# Patient Record
Sex: FEMALE | Race: WHITE | NOT HISPANIC OR LATINO
[De-identification: names, ages, dates, MRNs, and addresses within clinical notes are randomized per-mention and may not be internally consistent; named-entity substitution may affect disease eponyms.]

---

## 2017-07-29 ENCOUNTER — HEALTH MAINTENANCE LETTER (OUTPATIENT)
Age: 27
End: 2017-07-29

## 2017-09-19 ENCOUNTER — OFFICE VISIT (OUTPATIENT)
Dept: PLASTIC SURGERY | Facility: CLINIC | Age: 27
End: 2017-09-19

## 2017-09-19 ENCOUNTER — MEDICAL CORRESPONDENCE (OUTPATIENT)
Dept: HEALTH INFORMATION MANAGEMENT | Facility: CLINIC | Age: 27
End: 2017-09-19

## 2017-09-19 VITALS
HEART RATE: 74 BPM | SYSTOLIC BLOOD PRESSURE: 122 MMHG | DIASTOLIC BLOOD PRESSURE: 82 MMHG | HEIGHT: 66 IN | OXYGEN SATURATION: 97 % | TEMPERATURE: 98.5 F | BODY MASS INDEX: 37.37 KG/M2 | WEIGHT: 232.5 LBS

## 2017-09-19 DIAGNOSIS — F64.0 GENDER DYSPHORIA IN ADOLESCENT AND ADULT: Primary | ICD-10-CM

## 2017-09-19 RX ORDER — TESTOSTERONE ENANTHATE 200 MG/ML
100 INJECTION, SOLUTION INTRAMUSCULAR
COMMUNITY
End: 2018-03-06 | Stop reason: DRUGHIGH

## 2017-09-19 ASSESSMENT — PAIN SCALES - GENERAL: PAINLEVEL: NO PAIN (0)

## 2017-09-19 NOTE — MR AVS SNAPSHOT
"              After Visit Summary   2017    Luisana Quinonez    MRN: [id number]           Patient Information     Date Of Birth          1990        Visit Information        Provider Department      2017 9:00 AM Ely Ackerman MD [facility] Plastic and Reconstructive Surgery        Today's Diagnoses     Gender dysphoria in adolescent and adult    -  1       Follow-ups after your visit        Who to contact     Please call your clinic at [phone] to:    Ask questions about your health    Make or cancel appointments    Discuss your medicines    Learn about your test results    Speak to your doctor   If you have compliments or concerns about an experience at your clinic, or if you wish to file a complaint, please contact [facility] Patient Relations at [phone] or email us at [email or web address]         Additional Information About Your Visit        MyChart Information     [employer] is an electronic gateway that provides easy, online access to your medical records. With [employer], you can request a clinic appointment, read your test results, renew a prescription or communicate with your care team.     To sign up for [employer] visit the website at [email or web address]   You will be asked to enter the access code listed below, as well as some personal information. Please follow the directions to create your username and password.     Your access code is: [email or web address]  Expires: 2017  5:30 AM     Your access code will  in 90 days. If you need help or a new code, please contact your [facility] or call [phone] for assistance.        Care EveryWhere ID     This is your Care EveryWhere ID. This could be used by other organizations to access your [place] medical records  [id number]        Your Vitals Were     Pulse Temperature Height Pulse Oximetry BMI (Body Mass Index)       74 98.5  F (36.9  C) (Oral) 5' 6\" " 97% 37.53 kg/m2        Blood Pressure from Last 3 Encounters:   09/19/17 122/82   12/08/11 121/70   05/16/11 110/64    Weight from Last 3 Encounters:   09/19/17 232 lb 8 oz   05/16/11 176 lb (94 %)*   03/13/09 151 lb (88 %)*     * Growth percentiles are based on [facility] 2-20 Years data.              Today, you had the following     No orders found for display       Primary Care Provider Office Phone # Fax #    David Hernandez [phone]       [facility] [address]  [facility] [address]        Equal Access to Services     [facility]: Hadii mode vaughno Sohumberto, waaxda luqadaha, qaybta kaalmada adejigneshda, dolores tinoco . So [facility] [phone].    ATENCIÓN: Si habla español, tiene a galindo disposición servicios gratuitos de asistencia lingüística. Llame al [phone].    We comply with applicable federal civil rights laws and [place] laws. We do not discriminate on the basis of race, color, national origin, age, disability, sex, sexual orientation, or gender identity.            Thank you!     Thank you for choosing [facility] PLASTIC AND RECONSTRUCTIVE SURGERY  for your care. Our goal is always to provide you with excellent care. Hearing back from our patients is one way we can continue to improve our services. Please take a few minutes to complete the written survey that you may receive in the mail after your visit with us. Thank you!             Your Updated Medication List - Protect others around you: Learn how to safely use, store and throw away your medicines at www.disposemymeds.org.          This list is accurate as of: 9/19/17 11:59 PM.  Always use your most recent med list.                   Brand Name Dispense Instructions for use Diagnosis    cetirizine 10 MG tablet    zyrTEC    30 tablet    Take 1 tablet by mouth daily.    Allergic rhinitis, Conjunctivitis, allergic       * DEPAKOTE PO      Take 125 mg by mouth 2 times daily        * divalproex 250  MG EC tablet    DEPAKOTE    270 tablet    Take 3 tablets by mouth 2 times daily.    Intractable absence seizures (H)       fluticasone 50 MCG/ACT spray    FLONASE    1 Package    1 spray by Both Nostrils route 2 times daily. 1 spray each nostril AM & PM    Allergic rhinitis       ketorolac 0.5 % ophthalmic solution    ACULAR    5 mL    Place 1 drop into both eyes 4 times daily.    Conjunctivitis, allergic, Allergic rhinitis       ondansetron 2 MG/ML Soln    ZOFRAN     Inject 0.1 mg/kg into the vein every 6 hours as needed for nausea or vomiting        predniSONE 20 MG tablet    DELTASONE    10 tablet    Take 1 tablet by mouth 2 times daily. For asthma attack    Intermittent asthma       PROAIR  (90 BASE) MCG/ACT Inhaler   Generic drug:  albuterol     3 Inhaler    Inhale 2 puffs into the lungs every 2 hours as needed.    Cough       testosterone enanthate 200 MG/ML injection    DELATESTRYL     Inject 100 mg into the muscle once as needed (WEEK)        TYLENOL PO      Take 160 mg by mouth every 4 hours as needed for mild pain or fever        * Notice:  This list has 2 medication(s) that are the same as other medications prescribed for you. Read the directions carefully, and ask your doctor or other care provider to review them with you.

## 2017-09-19 NOTE — NURSING NOTE
"Chief Complaint   Patient presents with     Consult     consult for top surgery        Vitals:    09/19/17 0946   BP: 122/82   Pulse: 74   Temp: 98.5  F (36.9  C)   TempSrc: Oral   SpO2: 97%   Weight: 232 lb 8 oz   Height: 5' 6\"       Body mass index is 37.53 kg/(m^2).      Shayan HARVEY                  "

## 2017-09-19 NOTE — LETTER
"9/19/2017       RE: Luisana Quinonez  [address] [place]  [facility] [address]     Dear Colleague,    Thank you for referring your patient, Luisana Quinonez, to the [facility] PLASTIC AND RECONSTRUCTIVE SURGERY at [facility]. Please see a copy of my visit note below.    PLASTICS NEW    This is a 26 year old biological female who goes by the name \"Jen\" and uses male pronouns. He said the checkin process was \"weird\" with respect to gender issues. He is here to discuss possible top surgery.  He found us online.   He has been on testosterone topical gel since December, then switched to injections in July and is still adjusting his dose/levels with Dr Riccardo Torrez at the [facility].  He has also been seeing his therapist Nba Castro at [facility] for the past 6 months and has an old letter of support that will need to be updated.     He has been living his chosen gender role for the past 4 years, but had a sense of his gender identity as far back as age 5. He does not bind his breasts and denies any history of personal breast problems.    PMH: gender dysphoria. Obesity. Acne. Asthma- inhaler PRN, activated by exercise/animal dander/seasonal allergies/cold. History of absence epileptic seizures age 5-4 years, on meds. Achilles tendinitis, ? Plantar fasciitis. Denies diabetes, GERD, mental health diagnoses, bleeding/clotting or healing/scarring issues.     PSH: 3rd molar extractions. No problem with anesthesia.     FHX: breast cancer - MGM, no ovarian cancer. MGM-DVTs. Dad - MIx3.     SHX: out of work as a  at the [employer]. 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 Family is supportive of his transition, father still processing it. Living with roommate. Non-smoker, social ETOH. Diet- omnivore, eats fast/processed foods at work. Likes breads, not sweets. Pop-1 or 2 cans per day. Coffee 1-2 cups per week. Exercise- active at work, will " "start at gym. Sleep- slight delay, but usually averages 7-9 hours.     PE: 5'5.5\", 232 lbs.  Obese. Large breasts, grade 3 ptosis. Low IMFs at least 3-4 cms. Fibrofatty on palpation, no masses. No adenopathy. Moderate lateral thoracic rolls. Decent anterior chest contour. Scattered acne. Photos taken with written consent.    A/P: gender dysphoria. Good candidate for bilateral SQ mastectomies with nipple grafts. Will need mammogram. Has [employer] insurance so will need an updated letter of support from his therapist.     Total time = 45 minutes, greater than half spent on discussing surgical options and insurance issues.        Again, thank you for allowing me to participate in the care of your patient.      Sincerely,    Ely Ackerman MD      "

## 2017-09-22 ENCOUNTER — TELEPHONE (OUTPATIENT)
Dept: SURGERY | Facility: CLINIC | Age: 27
End: 2017-09-22

## 2017-09-22 NOTE — TELEPHONE ENCOUNTER
Received therapist letter from Dr Wang from [facility].  Left voice message with patient to call me back

## 2017-09-22 NOTE — TELEPHONE ENCOUNTER
Discovered that patient is listed under MRN#[id number] as a male -Jen-has correct address information and insurance information.  MRN-patient is listed as a female-nothing correct on this account.  I am asking for help on how to fix

## 2017-10-16 ENCOUNTER — TELEPHONE (OUTPATIENT)
Dept: SURGERY | Facility: CLINIC | Age: 27
End: 2017-10-16

## 2017-10-16 NOTE — TELEPHONE ENCOUNTER
Received a voice message from patient.  Left a voice message, stating his insurance is on a calendar year, so will need to re-enroll in January.  Once that is done, please call me back with insurance information

## 2017-10-20 ENCOUNTER — TELEPHONE (OUTPATIENT)
Dept: SURGERY | Facility: CLINIC | Age: 27
End: 2017-10-20

## 2017-10-20 NOTE — TELEPHONE ENCOUNTER
Received phone call from patient, states he called insurance, they will not do an extension on a prior authorization request.  Asked him then if his insurance needs renewing at end of year, he states no, told I would begin work on the prior authorization request this coming monday

## 2017-10-24 ENCOUNTER — TELEPHONE (OUTPATIENT)
Dept: SURGERY | Facility: CLINIC | Age: 27
End: 2017-10-24

## 2017-10-24 NOTE — TELEPHONE ENCOUNTER
Received phone call from patient, wondering if I had submitted the prior authorization request yet.  No I have not.  I talked to dr Ackerman's nurse-Eloisa, she will pass message onto dr Ackerman that I need the documentation

## 2017-10-31 ENCOUNTER — TELEPHONE (OUTPATIENT)
Dept: SURGERY | Facility: CLINIC | Age: 27
End: 2017-10-31

## 2017-10-31 NOTE — TELEPHONE ENCOUNTER
Received phone call from patient, wanting to know status of account.  I called and talked to Eloisa-Dr Ackerman's nurse.  Dr Ackerman has been out of town recently, back and forth for conferences.  Left voice message with patient to call me back, getting documentation is top priority, give us more time

## 2017-11-09 ENCOUNTER — TELEPHONE (OUTPATIENT)
Dept: PLASTIC SURGERY | Facility: CLINIC | Age: 27
End: 2017-11-09

## 2017-11-10 NOTE — TELEPHONE ENCOUNTER
Nurse returned patients voicemail about needing physicians notes entered so that the PA could be done so surgery could be scheduled.  Spoke with patient regarding concerns that surgery would not be able to be scheduled by March when they have it all planned for or that the surgery and authorization would not be done in time before the insurance expires next December 2018.  Nurse let the patient know that Dr. Ackerman is working on entering the notes and that the nurse would check with Dr. Ackerman again on Tuesday 11/14/2017.  The patient was extremely upset about the dual patient records and the time taken to combine them along with the delay in the prior authorization.  The Nurse will call the patient back by Monday 11/20 to let them know where everything is.  The patient verbalized that, that was acceptable and would wait until then.

## 2017-11-13 NOTE — PROGRESS NOTES
"PLASTICS NEW    This is a 26 year old biological female who goes by the name \"Jen\" and uses male pronouns. He said the checkin process was \"weird\" with respect to gender issues. He is here to discuss possible top surgery.  He found us online.   He has been on testosterone topical gel since December, then switched to injections in July and is still adjusting his dose/levels with Dr Riccardo Torrez at the [facility].  He has also been seeing his therapist Nba Castro at [facility] for the past 6 months and has an old letter of support that will need to be updated.     He has been living his chosen gender role for the past 4 years, but had a sense of his gender identity as far back as age 5. He does not bind his breasts and denies any history of personal breast problems.    PMH: gender dysphoria. Obesity. Acne. Asthma- inhaler PRN, activated by exercise/animal dander/seasonal allergies/cold. History of absence epileptic seizures age 3-4 years, on meds. Achilles tendinitis, ? Plantar fasciitis. Denies diabetes, GERD, mental health diagnoses, bleeding/clotting or healing/scarring issues.     PSH: 3rd molar extractions. No problem with anesthesia.     FHX: breast cancer - MGM, no ovarian cancer. MGM-DVTs. Dad - MIx3.     SHX: out of work as a  at the [employer]. 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 Family is supportive of his transition, father still processing it. Living with roommate. Non-smoker, social ETOH. Diet- omnivore, eats fast/processed foods at work. Likes breads, not sweets. Pop-1 or 2 cans per day. Coffee 1-2 cups per week. Exercise- active at work, will start at gym. Sleep- slight delay, but usually averages 7-9 hours.     PE: 5'5.5\", 232 lbs.  Obese. Large breasts, grade 3 ptosis. Low IMFs at least 3-4 cms. Fibrofatty on palpation, no masses. No adenopathy. Moderate lateral thoracic rolls. Decent anterior chest contour. Scattered acne. Photos taken with " written consent.    A/P: gender dysphoria. Good candidate for bilateral SQ mastectomies with nipple grafts. Will need mammogram. Has [employer] insurance so will need an updated letter of support from his therapist.     Total time = 45 minutes, greater than half spent on discussing surgical options and insurance issues.

## 2017-11-14 ENCOUNTER — TELEPHONE (OUTPATIENT)
Dept: SURGERY | Facility: CLINIC | Age: 27
End: 2017-11-14

## 2017-11-14 NOTE — TELEPHONE ENCOUNTER
Talked to Quin RANDHAWAWISETIVI, told her I would be faxing in request for prior authorization soon, I can fax to her attention@[phone] and galdino quan.  Also left a voice message with patient to let him know I would be doing this.  At this time I am waiting for codes from  to use.

## 2017-11-15 ENCOUNTER — TELEPHONE (OUTPATIENT)
Dept: SURGERY | Facility: CLINIC | Age: 27
End: 2017-11-15

## 2017-11-15 NOTE — TELEPHONE ENCOUNTER
[facility]she will give to correct department for patient.  Marked as urgent

## 2017-11-16 ENCOUNTER — TELEPHONE (OUTPATIENT)
Dept: SURGERY | Facility: CLINIC | Age: 27
End: 2017-11-16

## 2017-11-16 NOTE — TELEPHONE ENCOUNTER
Received [employer] prior authorization approval #[id number], with date span 11/15/17-11/14/18.  Also received phone call from [employer], she called and talked to mother, told her this was approved.  I will send message to care team to let them know he is ready to be scheduled.

## 2017-11-21 ENCOUNTER — TELEPHONE (OUTPATIENT)
Dept: SURGERY | Facility: CLINIC | Age: 27
End: 2017-11-21

## 2017-11-21 NOTE — TELEPHONE ENCOUNTER
Patient calling in, has been waiting for a phone call for scheduling surgery with dr Ackermna, gave him phone # to call and ask to schedule

## 2018-03-06 ENCOUNTER — OFFICE VISIT (OUTPATIENT)
Dept: PLASTIC SURGERY | Facility: CLINIC | Age: 28
End: 2018-03-06
Payer: COMMERCIAL

## 2018-03-06 VITALS
DIASTOLIC BLOOD PRESSURE: 78 MMHG | HEART RATE: 84 BPM | TEMPERATURE: 97.6 F | SYSTOLIC BLOOD PRESSURE: 123 MMHG | HEIGHT: 66 IN | WEIGHT: 232.2 LBS | OXYGEN SATURATION: 98 % | BODY MASS INDEX: 37.32 KG/M2

## 2018-03-06 DIAGNOSIS — F64.0 GENDER DYSPHORIA IN ADOLESCENT AND ADULT: Primary | ICD-10-CM

## 2018-03-06 RX ORDER — TESTOSTERONE CYPIONATE 200 MG/ML
80 INJECTION, SOLUTION INTRAMUSCULAR
COMMUNITY
Start: 2018-03-06

## 2018-03-06 ASSESSMENT — PAIN SCALES - GENERAL: PAINLEVEL: NO PAIN (0)

## 2018-03-06 NOTE — NURSING NOTE
"Chief Complaint   Patient presents with     Pre-Op Exam     Pre surgery visit DOS 3/19/2018       Vitals:    03/06/18 1409   BP: 123/78   BP Location: Left arm   Patient Position: Sitting   Cuff Size: Adult Large   Pulse: 84   Temp: 97.6  F (36.4  C)   TempSrc: Oral   SpO2: 98%   Weight: 232 lb 3.2 oz   Height: 5' 5.75\"       Body mass index is 37.76 kg/(m^2).    Eloisa Bird LPN                          "

## 2018-03-06 NOTE — LETTER
3/6/2018       RE: Luisana Quinonez  [address]  [place] [facility] [address]     Dear Colleague,    Thank you for referring your patient, Luisana Quinonez, to the [facility] PLASTIC AND RECONSTRUCTIVE SURGERY at [facility]. Please see a copy of my visit note below.    PLASTICS PREOP    This 27 year old trans male is here for a preop visit.  He is scheduled for bilateral SQ mastectomies with nipple grafts on 3/19.   His H&P was done earlier today. His preop mammogram was negative.  He's here today with his fiance. They had a lot of frustrations with the process of getting to this point.    We talked about his absence seizures and allergies. We also talked about the shortage of Ropivicaine that we use for the OnQ pumps so he may be a candidate for a pectoralis block. He will have the opportunity to talk more with the anesthesia the day of surgery if he is interested in that alternative.    We discussed the possible risks and complications, as well as perioperative cares and limitations for his procedure.  Periop instructions included: NPO after midnight except for am meds with sip of water, preop shower with surgical soap. The events of the day of surgery were explained - including preop, intraop and postop phases of care. The patient wanted specific details about the surgical technique.  We also went over the possible risks and complications involved with this elective procedure. These include but are not limited to: infection, bleeding, hematoma/seroma formation, poor healing - including dehiscence, nipple graft loss, hypertrophic scarring. Altered chest sensation- either hypo or hypersensitive, residual deformities and asymmetries, possible further surgical revisions, possible injury to surrounding neurovascular and musculoskeletal structures, including intra-axillary or intra-thoracic, anesthetic risks such as DVT/PE or cardiopulmonary events.  Postop cares and limitations  were discussed with relation to his home and work settings.    We talked about getting a postop letter verifying that he will have had irreversible surgery, but since he is getting  in August, he will wait until then to change his name and gender marker since it won't cost extra then.     Overall, their questions and concerns were addressed. Any additional questions that might come up should be written down so we can discuss them before surgery.    Total time = 20 minutes, all spent educating the patient and his partner about upcoming procedure.       Again, thank you for allowing me to participate in the care of your patient.      Sincerely,    Ely Ackerman MD

## 2018-03-06 NOTE — MR AVS SNAPSHOT
After Visit Summary   3/6/2018    Luisana Quinonez    MRN: [id number]           Patient Information     Date Of Birth          1990        Visit Information        Provider Department      3/6/2018 2:00 PM Ely Ackerman MD [facility] Plastic and Reconstructive Surgery        Today's Diagnoses     Gender dysphoria in adolescent and adult    -  1       Follow-ups after your visit        Your next 10 appointments already scheduled     Mar 27, 2018  1:30 PM CDT   POST OP WOUND NURSE VISIT with Camila Sorto RN   [facility] Wound Ostomy ([facility])    [address] [place] [id number] [phone]              Who to contact     Please call your clinic at [phone] to:    Ask questions about your health    Make or cancel appointments    Discuss your medicines    Learn about your test results    Speak to your doctor            Additional Information About Your Visit        MyChart Information     [employer] is an electronic gateway that provides easy, online access to your medical records. With [employer], you can request a clinic appointment, read your test results, renew a prescription or communicate with your care team.     To sign up for [employer] visit the website at [email or web address]   You will be asked to enter the access code listed below, as well as some personal information. Please follow the directions to create your username and password.     Your access code is: [email or web address]  Expires: 2018  7:30 AM     Your access code will  in 90 days. If you need help or a new code, please contact your [facility] or call [phone] for assistance.        Care EveryWhere ID     This is your Care EveryWhere ID. This could be used by other organizations to access your [place] medical records  [id number]        Your Vitals Were     Pulse Temperature Height Pulse Oximetry BMI (Body Mass Index)  "      84 97.6  F (36.4  C) (Oral) 5' 5.75\" 98% 37.76 kg/m2        Blood Pressure from Last 3 Encounters:   03/19/18 93/63   03/06/18 123/78   09/19/17 122/82    Weight from Last 3 Encounters:   03/19/18 233 lb 11 oz   03/06/18 232 lb 3.2 oz   09/19/17 232 lb 8 oz              Today, you had the following     No orders found for display         Today's Medication Changes          These changes are accurate as of 3/6/18 11:59 PM.  If you have any questions, ask your nurse or doctor.               These medicines have changed or have updated prescriptions.        Dose/Directions    divalproex sodium delayed-release 250 MG DR tablet   Commonly known as:  DEPAKOTE   This may have changed:  Another medication with the same name was removed. Continue taking this medication, and follow the directions you see here.   Used for:  Intractable absence seizures (H)   Changed by:  Ely Ackerman MD        Dose:  750 mg   Take 3 tablets by mouth 2 times daily.   Quantity:  270 tablet   Refills:  1         Stop taking these medicines if you haven't already. Please contact your care team if you have questions.     ondansetron 2 MG/ML Soln   Commonly known as:  ZOFRAN   Stopped by:  Ely Ackerman MD           testosterone enanthate 200 MG/ML injection   Commonly known as:  DELATESTRYL   Stopped by:  Ely Ackerman MD                    Primary Care Provider Office Phone # Fax #    Clarice TRACY Read [phone]       [address] [address]        Equal Access to Services     [facility]: Hadii aad ku hadasho Soomaali, waaxda luqadaha, qaybta kaalmada adeegyada, waxay idiin haymin tinoco . So [facility] [phone].    ATENCIÓN: Si habla español, tiene a galindo disposición servicios gratuitos de asistencia lingüística. Llame al [phone].    We comply with applicable federal civil rights laws and [place] laws. We do not discriminate on the basis of race, " color, national origin, age, disability, sex, sexual orientation, or gender identity.            Thank you!     Thank you for choosing [facility] PLASTIC AND RECONSTRUCTIVE SURGERY  for your care. Our goal is always to provide you with excellent care. Hearing back from our patients is one way we can continue to improve our services. Please take a few minutes to complete the written survey that you may receive in the mail after your visit with us. Thank you!             Your Updated Medication List - Protect others around you: Learn how to safely use, store and throw away your medicines at www.disposemymeds.org.          This list is accurate as of 3/6/18 11:59 PM.  Always use your most recent med list.                   Brand Name Dispense Instructions for use Diagnosis    azithromycin 250 MG tablet    ZITHROMAX    6 tablet    Two tablets first day, then one tablet daily for four days.    S/P bilateral mastectomy       divalproex sodium delayed-release 250 MG DR tablet    DEPAKOTE    270 tablet    Take 3 tablets by mouth 2 times daily.    Intractable absence seizures (H)       fluticasone 50 MCG/ACT spray    FLONASE    1 Package    1 spray by Both Nostrils route 2 times daily. 1 spray each nostril AM & PM    Allergic rhinitis       hydrOXYzine 25 MG tablet    ATARAX    20 tablet    Take 1-2 tablets (25-50 mg) by mouth every 6 hours as needed for itching    S/P bilateral mastectomy       loratadine 10 MG tablet    CLARITIN     Take 10 mg by mouth daily        * ONDANSETRON PO      Take 4 mg by mouth every 6 hours as needed for nausea        * ondansetron 4 MG ODT tab    ZOFRAN ODT    20 tablet    Take 1-2 tablets (4-8 mg) by mouth every 8 hours as needed for nausea    S/P bilateral mastectomy       oxyCODONE IR 5 MG tablet    ROXICODONE    50 tablet    Take 1 tablet (5 mg) by mouth every 4 hours as needed for pain maximum 8 tablet(s) per day    S/P bilateral mastectomy       PROAIR  (90 BASE) MCG/ACT Inhaler    Generic drug:  albuterol     3 Inhaler    Inhale 2 puffs into the lungs every 2 hours as needed.    Cough       testosterone cypionate 200 MG/ML injection    DEPOTESTOTERONE     Inject 80 mg into the muscle        TYLENOL PO      Take 160 mg by mouth every 4 hours as needed for mild pain or fever        * Notice:  This list has 2 medication(s) that are the same as other medications prescribed for you. Read the directions carefully, and ask your doctor or other care provider to review them with you.

## 2018-03-18 ENCOUNTER — ANESTHESIA EVENT (OUTPATIENT)
Dept: SURGERY | Facility: CLINIC | Age: 28
End: 2018-03-18
Payer: COMMERCIAL

## 2018-03-19 ENCOUNTER — ANESTHESIA (OUTPATIENT)
Dept: SURGERY | Facility: CLINIC | Age: 28
End: 2018-03-19
Payer: COMMERCIAL

## 2018-03-19 ENCOUNTER — HOSPITAL ENCOUNTER (OUTPATIENT)
Facility: CLINIC | Age: 28
Discharge: HOME | End: 2018-03-19
Attending: SURGERY | Admitting: SURGERY
Payer: COMMERCIAL

## 2018-03-19 VITALS
BODY MASS INDEX: 37.56 KG/M2 | TEMPERATURE: 98.4 F | DIASTOLIC BLOOD PRESSURE: 63 MMHG | WEIGHT: 233.69 LBS | HEIGHT: 66 IN | OXYGEN SATURATION: 93 % | RESPIRATION RATE: 18 BRPM | HEART RATE: 98 BPM | SYSTOLIC BLOOD PRESSURE: 93 MMHG

## 2018-03-19 DIAGNOSIS — Z90.13 S/P BILATERAL MASTECTOMY: Primary | ICD-10-CM

## 2018-03-19 LAB — GLUCOSE BLDC GLUCOMTR-MCNC: 90 MG/DL (ref 70–99)

## 2018-03-19 PROCEDURE — 27110038 ZZH RX 271: Performed by: SURGERY

## 2018-03-19 PROCEDURE — 88305 TISSUE EXAM BY PATHOLOGIST: CPT | Performed by: SURGERY

## 2018-03-19 PROCEDURE — 37000009 ZZH ANESTHESIA TECHNICAL FEE, EACH ADDTL 15 MIN: Performed by: SURGERY

## 2018-03-19 PROCEDURE — 37000008 ZZH ANESTHESIA TECHNICAL FEE, 1ST 30 MIN: Performed by: SURGERY

## 2018-03-19 PROCEDURE — 25000128 H RX IP 250 OP 636: Performed by: ANESTHESIOLOGY

## 2018-03-19 PROCEDURE — 71000014 ZZH RECOVERY PHASE 1 LEVEL 2 FIRST HR: Performed by: SURGERY

## 2018-03-19 PROCEDURE — 25000132 ZZH RX MED GY IP 250 OP 250 PS 637: Performed by: ANESTHESIOLOGY

## 2018-03-19 PROCEDURE — 25000566 ZZH SEVOFLURANE, EA 15 MIN: Performed by: SURGERY

## 2018-03-19 PROCEDURE — 40000170 ZZH STATISTIC PRE-PROCEDURE ASSESSMENT II: Performed by: SURGERY

## 2018-03-19 PROCEDURE — 27210794 ZZH OR GENERAL SUPPLY STERILE: Performed by: SURGERY

## 2018-03-19 PROCEDURE — 25000125 ZZHC RX 250: Performed by: ANESTHESIOLOGY

## 2018-03-19 PROCEDURE — 36000059 ZZH SURGERY LEVEL 3 EA 15 ADDTL MIN UMMC: Performed by: SURGERY

## 2018-03-19 PROCEDURE — 25000125 ZZHC RX 250: Performed by: SURGERY

## 2018-03-19 PROCEDURE — 27210995 ZZH RX 272: Performed by: SURGERY

## 2018-03-19 PROCEDURE — 71000015 ZZH RECOVERY PHASE 1 LEVEL 2 EA ADDTL HR: Performed by: SURGERY

## 2018-03-19 PROCEDURE — 71000027 ZZH RECOVERY PHASE 2 EACH 15 MINS: Performed by: SURGERY

## 2018-03-19 PROCEDURE — 25000128 H RX IP 250 OP 636: Performed by: SURGERY

## 2018-03-19 PROCEDURE — 82962 GLUCOSE BLOOD TEST: CPT

## 2018-03-19 PROCEDURE — 36000057 ZZH SURGERY LEVEL 3 1ST 30 MIN - UMMC: Performed by: SURGERY

## 2018-03-19 PROCEDURE — 88305 TISSUE EXAM BY PATHOLOGIST: CPT | Mod: 26 | Performed by: SURGERY

## 2018-03-19 PROCEDURE — 25000125 ZZHC RX 250: Performed by: NURSE ANESTHETIST, CERTIFIED REGISTERED

## 2018-03-19 PROCEDURE — C9399 UNCLASSIFIED DRUGS OR BIOLOG: HCPCS | Performed by: NURSE ANESTHETIST, CERTIFIED REGISTERED

## 2018-03-19 PROCEDURE — 25000128 H RX IP 250 OP 636: Performed by: NURSE ANESTHETIST, CERTIFIED REGISTERED

## 2018-03-19 RX ORDER — NALOXONE HYDROCHLORIDE 0.4 MG/ML
.1-.4 INJECTION, SOLUTION INTRAMUSCULAR; INTRAVENOUS; SUBCUTANEOUS
Status: DISCONTINUED | OUTPATIENT
Start: 2018-03-19 | End: 2018-03-19 | Stop reason: HOSPADM

## 2018-03-19 RX ORDER — ONDANSETRON 2 MG/ML
4 INJECTION INTRAMUSCULAR; INTRAVENOUS EVERY 30 MIN PRN
Status: DISCONTINUED | OUTPATIENT
Start: 2018-03-19 | End: 2018-03-19 | Stop reason: HOSPADM

## 2018-03-19 RX ORDER — AZITHROMYCIN 250 MG/1
TABLET, FILM COATED ORAL
Qty: 6 TABLET | Refills: 0 | Status: SHIPPED | OUTPATIENT
Start: 2018-03-19 | End: 2018-05-08

## 2018-03-19 RX ORDER — LORATADINE 10 MG/1
10 TABLET ORAL DAILY
COMMUNITY

## 2018-03-19 RX ORDER — OXYCODONE HYDROCHLORIDE 5 MG/1
5 TABLET ORAL EVERY 4 HOURS PRN
Status: DISCONTINUED | OUTPATIENT
Start: 2018-03-19 | End: 2018-03-19 | Stop reason: HOSPADM

## 2018-03-19 RX ORDER — HYDROXYZINE HYDROCHLORIDE 25 MG/1
25-50 TABLET, FILM COATED ORAL EVERY 6 HOURS PRN
Qty: 20 TABLET | Refills: 1 | Status: SHIPPED | OUTPATIENT
Start: 2018-03-19 | End: 2018-05-08

## 2018-03-19 RX ORDER — FENTANYL CITRATE 50 UG/ML
25-50 INJECTION, SOLUTION INTRAMUSCULAR; INTRAVENOUS
Status: DISCONTINUED | OUTPATIENT
Start: 2018-03-19 | End: 2018-03-19 | Stop reason: HOSPADM

## 2018-03-19 RX ORDER — ONDANSETRON 4 MG/1
4-8 TABLET, ORALLY DISINTEGRATING ORAL EVERY 8 HOURS PRN
Qty: 20 TABLET | Refills: 1 | Status: SHIPPED | OUTPATIENT
Start: 2018-03-19 | End: 2018-05-08

## 2018-03-19 RX ORDER — GABAPENTIN 100 MG/1
300 CAPSULE ORAL ONCE
Status: COMPLETED | OUTPATIENT
Start: 2018-03-19 | End: 2018-03-19

## 2018-03-19 RX ORDER — PROPOFOL 10 MG/ML
INJECTION, EMULSION INTRAVENOUS PRN
Status: DISCONTINUED | OUTPATIENT
Start: 2018-03-19 | End: 2018-03-19

## 2018-03-19 RX ORDER — MEPERIDINE HYDROCHLORIDE 25 MG/ML
12.5 INJECTION INTRAMUSCULAR; INTRAVENOUS; SUBCUTANEOUS
Status: DISCONTINUED | OUTPATIENT
Start: 2018-03-19 | End: 2018-03-19 | Stop reason: HOSPADM

## 2018-03-19 RX ORDER — OXYCODONE HYDROCHLORIDE 5 MG/1
5 TABLET ORAL EVERY 4 HOURS PRN
Qty: 50 TABLET | Refills: 0 | Status: SHIPPED | OUTPATIENT
Start: 2018-03-19 | End: 2018-05-08

## 2018-03-19 RX ORDER — SODIUM CHLORIDE, SODIUM LACTATE, POTASSIUM CHLORIDE, CALCIUM CHLORIDE 600; 310; 30; 20 MG/100ML; MG/100ML; MG/100ML; MG/100ML
INJECTION, SOLUTION INTRAVENOUS CONTINUOUS
Status: DISCONTINUED | OUTPATIENT
Start: 2018-03-19 | End: 2018-03-19 | Stop reason: HOSPADM

## 2018-03-19 RX ORDER — KETOROLAC TROMETHAMINE 30 MG/ML
INJECTION, SOLUTION INTRAMUSCULAR; INTRAVENOUS PRN
Status: DISCONTINUED | OUTPATIENT
Start: 2018-03-19 | End: 2018-03-19

## 2018-03-19 RX ORDER — ONDANSETRON 2 MG/ML
INJECTION INTRAMUSCULAR; INTRAVENOUS PRN
Status: DISCONTINUED | OUTPATIENT
Start: 2018-03-19 | End: 2018-03-19

## 2018-03-19 RX ORDER — CEFAZOLIN SODIUM 1 G/3ML
1 INJECTION, POWDER, FOR SOLUTION INTRAMUSCULAR; INTRAVENOUS SEE ADMIN INSTRUCTIONS
Status: DISCONTINUED | OUTPATIENT
Start: 2018-03-19 | End: 2018-03-19 | Stop reason: HOSPADM

## 2018-03-19 RX ORDER — SODIUM CHLORIDE, SODIUM LACTATE, POTASSIUM CHLORIDE, CALCIUM CHLORIDE 600; 310; 30; 20 MG/100ML; MG/100ML; MG/100ML; MG/100ML
INJECTION, SOLUTION INTRAVENOUS CONTINUOUS PRN
Status: DISCONTINUED | OUTPATIENT
Start: 2018-03-19 | End: 2018-03-19

## 2018-03-19 RX ORDER — ACETAMINOPHEN 325 MG/1
975 TABLET ORAL ONCE
Status: COMPLETED | OUTPATIENT
Start: 2018-03-19 | End: 2018-03-19

## 2018-03-19 RX ORDER — FENTANYL CITRATE 50 UG/ML
INJECTION, SOLUTION INTRAMUSCULAR; INTRAVENOUS PRN
Status: DISCONTINUED | OUTPATIENT
Start: 2018-03-19 | End: 2018-03-19

## 2018-03-19 RX ORDER — LIDOCAINE HYDROCHLORIDE 20 MG/ML
INJECTION, SOLUTION INFILTRATION; PERINEURAL PRN
Status: DISCONTINUED | OUTPATIENT
Start: 2018-03-19 | End: 2018-03-19

## 2018-03-19 RX ORDER — CEFAZOLIN SODIUM 2 G/100ML
2 INJECTION, SOLUTION INTRAVENOUS
Status: COMPLETED | OUTPATIENT
Start: 2018-03-19 | End: 2018-03-19

## 2018-03-19 RX ORDER — ALBUTEROL SULFATE 0.83 MG/ML
2.5 SOLUTION RESPIRATORY (INHALATION) ONCE
Status: COMPLETED | OUTPATIENT
Start: 2018-03-19 | End: 2018-03-19

## 2018-03-19 RX ORDER — SCOLOPAMINE TRANSDERMAL SYSTEM 1 MG/1
1 PATCH, EXTENDED RELEASE TRANSDERMAL ONCE
Status: COMPLETED | OUTPATIENT
Start: 2018-03-19 | End: 2018-03-19

## 2018-03-19 RX ORDER — DEXAMETHASONE SODIUM PHOSPHATE 4 MG/ML
INJECTION, SOLUTION INTRA-ARTICULAR; INTRALESIONAL; INTRAMUSCULAR; INTRAVENOUS; SOFT TISSUE PRN
Status: DISCONTINUED | OUTPATIENT
Start: 2018-03-19 | End: 2018-03-19

## 2018-03-19 RX ORDER — ONDANSETRON 4 MG/1
4 TABLET, ORALLY DISINTEGRATING ORAL EVERY 30 MIN PRN
Status: DISCONTINUED | OUTPATIENT
Start: 2018-03-19 | End: 2018-03-19 | Stop reason: HOSPADM

## 2018-03-19 RX ORDER — BUPIVACAINE HYDROCHLORIDE 5 MG/ML
INJECTION, SOLUTION PERINEURAL PRN
Status: DISCONTINUED | OUTPATIENT
Start: 2018-03-19 | End: 2018-03-19 | Stop reason: HOSPADM

## 2018-03-19 RX ADMIN — ALBUTEROL SULFATE 2.5 MG: 2.5 SOLUTION RESPIRATORY (INHALATION) at 13:01

## 2018-03-19 RX ADMIN — MIDAZOLAM 2 MG: 1 INJECTION INTRAMUSCULAR; INTRAVENOUS at 07:39

## 2018-03-19 RX ADMIN — PROPOFOL 30 MG: 10 INJECTION, EMULSION INTRAVENOUS at 09:33

## 2018-03-19 RX ADMIN — OXYCODONE HYDROCHLORIDE 5 MG: 5 TABLET ORAL at 13:37

## 2018-03-19 RX ADMIN — CEFAZOLIN 1 G: 1 INJECTION, POWDER, FOR SOLUTION INTRAMUSCULAR; INTRAVENOUS at 09:57

## 2018-03-19 RX ADMIN — Medication 0.2 MG: at 13:21

## 2018-03-19 RX ADMIN — SCOPALAMINE 1 PATCH: 1 PATCH, EXTENDED RELEASE TRANSDERMAL at 06:41

## 2018-03-19 RX ADMIN — FENTANYL CITRATE 50 MCG: 50 INJECTION, SOLUTION INTRAMUSCULAR; INTRAVENOUS at 08:23

## 2018-03-19 RX ADMIN — ONDANSETRON 4 MG: 2 INJECTION INTRAMUSCULAR; INTRAVENOUS at 12:45

## 2018-03-19 RX ADMIN — HYDROMORPHONE HYDROCHLORIDE 0.2 MG: 1 INJECTION, SOLUTION INTRAMUSCULAR; INTRAVENOUS; SUBCUTANEOUS at 11:03

## 2018-03-19 RX ADMIN — DEXAMETHASONE SODIUM PHOSPHATE 6 MG: 4 INJECTION, SOLUTION INTRAMUSCULAR; INTRAVENOUS at 08:12

## 2018-03-19 RX ADMIN — FENTANYL CITRATE 25 MCG: 50 INJECTION, SOLUTION INTRAMUSCULAR; INTRAVENOUS at 12:29

## 2018-03-19 RX ADMIN — Medication 0.3 MG: at 13:05

## 2018-03-19 RX ADMIN — GABAPENTIN 300 MG: 300 CAPSULE ORAL at 06:41

## 2018-03-19 RX ADMIN — FENTANYL CITRATE 25 MCG: 50 INJECTION, SOLUTION INTRAMUSCULAR; INTRAVENOUS at 12:56

## 2018-03-19 RX ADMIN — LIDOCAINE HYDROCHLORIDE 40 MG: 20 INJECTION, SOLUTION INFILTRATION; PERINEURAL at 07:47

## 2018-03-19 RX ADMIN — PHENYLEPHRINE HYDROCHLORIDE 100 MCG: 10 INJECTION, SOLUTION INTRAMUSCULAR; INTRAVENOUS; SUBCUTANEOUS at 11:25

## 2018-03-19 RX ADMIN — ROCURONIUM BROMIDE 50 MG: 10 INJECTION INTRAVENOUS at 07:47

## 2018-03-19 RX ADMIN — FENTANYL CITRATE 50 MCG: 50 INJECTION, SOLUTION INTRAMUSCULAR; INTRAVENOUS at 07:47

## 2018-03-19 RX ADMIN — SODIUM CHLORIDE, POTASSIUM CHLORIDE, SODIUM LACTATE AND CALCIUM CHLORIDE: 600; 310; 30; 20 INJECTION, SOLUTION INTRAVENOUS at 10:59

## 2018-03-19 RX ADMIN — PROPOFOL 200 MG: 10 INJECTION, EMULSION INTRAVENOUS at 07:47

## 2018-03-19 RX ADMIN — KETOROLAC TROMETHAMINE 30 MG: 30 INJECTION, SOLUTION INTRAMUSCULAR at 11:31

## 2018-03-19 RX ADMIN — HYDROMORPHONE HYDROCHLORIDE 0.3 MG: 1 INJECTION, SOLUTION INTRAMUSCULAR; INTRAVENOUS; SUBCUTANEOUS at 12:09

## 2018-03-19 RX ADMIN — CEFAZOLIN SODIUM 2 G: 2 INJECTION, SOLUTION INTRAVENOUS at 07:58

## 2018-03-19 RX ADMIN — Medication: at 11:57

## 2018-03-19 RX ADMIN — SODIUM CHLORIDE, POTASSIUM CHLORIDE, SODIUM LACTATE AND CALCIUM CHLORIDE: 600; 310; 30; 20 INJECTION, SOLUTION INTRAVENOUS at 07:39

## 2018-03-19 RX ADMIN — HYDROMORPHONE HYDROCHLORIDE 0.3 MG: 1 INJECTION, SOLUTION INTRAMUSCULAR; INTRAVENOUS; SUBCUTANEOUS at 10:46

## 2018-03-19 RX ADMIN — ONDANSETRON 4 MG: 2 INJECTION INTRAMUSCULAR; INTRAVENOUS at 11:31

## 2018-03-19 RX ADMIN — Medication: at 13:40

## 2018-03-19 RX ADMIN — PHENYLEPHRINE HYDROCHLORIDE 100 MCG: 10 INJECTION, SOLUTION INTRAMUSCULAR; INTRAVENOUS; SUBCUTANEOUS at 11:09

## 2018-03-19 RX ADMIN — ACETAMINOPHEN 975 MG: 325 TABLET, FILM COATED ORAL at 06:41

## 2018-03-19 RX ADMIN — SUGAMMADEX 200 MG: 100 INJECTION, SOLUTION INTRAVENOUS at 11:55

## 2018-03-19 RX ADMIN — PROPOFOL 40 MG: 10 INJECTION, EMULSION INTRAVENOUS at 09:06

## 2018-03-19 ASSESSMENT — ENCOUNTER SYMPTOMS: SEIZURES: 1

## 2018-03-19 NOTE — ANESTHESIA CARE TRANSFER NOTE
Patient: Luisana Quinonez    Procedure(s):  Bilateral Subcutaneous Mastectomy With Nipple Grafts, ONQ - Wound Class: I-Clean    Diagnosis: Transgender/Gender Dysphoria  Diagnosis Additional Information: No value filed.    Anesthesia Type:   General, ETT     Note:  Airway :Face Mask  Patient transferred to:PACU  Comments: Arrived in PACU, report to RN, vitals stable, temp 36.8, PIV patent, patient comfortable.Handoff Report: Identifed the Patient, Identified the Reponsible Provider, Reviewed the pertinent medical history, Discussed the surgical course, Reviewed Intra-OP anesthesia mangement and issues during anesthesia, Set expectations for post-procedure period and Allowed opportunity for questions and acknowledgement of understanding      Vitals: (Last set prior to Anesthesia Care Transfer)    CRNA VITALS  3/19/2018 1134 - 3/19/2018 1209      3/19/2018             Pulse: 105    SpO2: 98 %    Resp Rate (observed): 8                Electronically Signed By: CLEMENCIA Yu CRNA  March 19, 2018  12:09 PM

## 2018-03-19 NOTE — IP AVS SNAPSHOT
MRN:[id number]                      After Visit Summary   3/19/2018    Luisana Quinonez    MRN: [id number]           Thank you!     Thank you for choosing [facility] for your care. Our goal is always to provide you with excellent care. Hearing back from our patients is one way we can continue to improve our services. Please take a few minutes to complete the written survey that you may receive in the mail after you visit with us. Thank you!        Patient Information     Date Of Birth          1990        About your hospital stay     You were admitted on:  March 19, 2018 You last received care in the:   PACU    You were discharged on:  March 19, 2018        Reason for your hospital stay       S/p bilateral subcutaneous mastectomies with nipple grafts. OnQ pain catheters.  Tolerated under GA.  OK to dc home per anesthesia criteria.                  Who to Call     For medical emergencies, please call 911.  For non-urgent questions about your medical care, please call your primary care provider or clinic, [phone]  For questions related to your surgery, please call your surgery clinic        Attending Provider     Provider Specialty    Ely Ackerman MD Plastic Surgery       Primary Care Provider Office Phone # Fax #    Clarice Read PA-C [phone]       When to contact your care team       Call Plastic Surgery Clinic during daytime hours (Eloisa: [phone], OR Camila: [phone]), OR the hospital  for nights/ weekends ([phone]) to speak with plastic surgery on-call resident IF you have any of the following: temperature >102.5, increased bleeding noted in drains or under skin, reactions to meds, reactions to pain pump (tingling around lips or ringing in ears), any problems with drains/pain catheters/or dressings.                  After Care Instructions     Activity       Your activity upon discharge: no heavy lifting > 5 lbs x 3 weeks. AVOID  "excessive/ extreme use of upper body/ extremities x 3 weeks. OK to do gentle movements for daily cares.  AVOID direct trauma to surgical sites.  OK to sleep on your back or modified side position (may be uncomfortable with incisions and drains on the side). CANNOT sleep on stomach for at least the first 2 weeks.  May want to consider sleeping with pillows to prevent from rolling over.   Some patients prefer to sleep in a recliner to prevent rolling, but elevation is not required.            Diet       Follow this diet upon discharge: Advance to a regular diet as tolerated.  Drink plenty of fluids to help prevent constipation.  Get plenty of protein, vitamins and minerals (fruits and veggies)  to help with healing.  Can resume usual preop meds and supplements as tolerated.            Monitor and record       VIC drain output EVERY morning and EVERY night.  Usually between 30-50 mls per day, but don't be alarmed is more or less. May not be equal between sides. Will probably drop off when pain pump is empty.  Usually fluid looks like cherry Koolaid at first, then Hawaiian punch color, then peach tea over time.  May notice protein \"clots\" that look like \"worms\" in the tubing. Do not be alarmed -this is just precipitated protein from the fluid that is weeping from your raw tissues, much like what you see when you scrape your knee.  We DO want to be notified IF: there is increasing amount of fresh bright red blood that rapidly fills the suction bulb after emptying. OR if blood collecting under the skin and causing significant painful swelling on one side (expanding hematoma).            Supplies       List the supplies the pt needs to go home:  PLEASE send supplies for VIC drain cares.  Please instruct caregivers on drain cares.  Please send home with spare ACE wraps from OR.  THANK YOU            Tubes and drains       You are going home with the following tubes or drains: VIC.  Follow these instructions  to care for your " tube.  STRIP tubing and MEASURE/RECORD output Qam & Qpm.  Please bring output record to follow up appointment.  MAKE SURE NURSES INSTRUCT ON DRAIN CARES BEFORE GOING HOME.            Wound care and dressings       Instructions to care for your wound at home: as directed.  Keep nipple graft dressings DRY. NO SHOWERS until after follow up appointment.   OK to rewrap ACE if too tight or too loose.   Do NOT mess with dressings underneath ACE wrap or nipple grafts dressings.                  Follow-up Appointments     Follow Up and recommended labs and tests       Follow up with me, Dr. Ackerman, within 1 week at [address] [place] [address]. to evaluate after surgery.  No follow up labs or test are needed.                  Your next 10 appointments already scheduled     Mar 27, 2018  1:30 PM CDT   POST OP WOUND NURSE VISIT with Camila Sorto RN   [facility] Wound Ostomy ([facility])    [address] [place] [address]  4th [facility] [address] [phone]              Further instructions from your care team         ON-Q  C-bloc Continuous Nerve Block Discharge Instructions  The Nerve Block: {UR ON Q Blocks:302434}      Your anesthesiologist performed a nerve block (a procedure that blocks pain to only a specific area) by inserting a small tube in your body.  This tube is connected to a pump that will help control your pain.    The pump is shaped like a balloon and is filled with medicine that causes numbness or loss of sensation to help control your pain around the incision or site of injury.  The pain pump DOES NOT contain controlled substances.      The medicine in the pump may alter your ability to feel changes in temperature or pressure. Your doctor will tell you if any special precautions apply to you.       The Pump      DO NOT SQUEEZE THE PUMP.     The pump delivers medicine at a very slow rate.    You will NOT see the medicine moving through the tubing.    As the medicine is delivered, the  pump ball will slowly become smaller.    It may take a day or so before you notice a change in the size and look of the pump.    Depending on the size of your pump, it may take 2 - 5 days to give all the medicine.    The middle part of the pump may look like an apple core when empty.  Managing Your Pain  The Medicine and Infusion Rate:   {onq medications:742953} {onq med dosing rate:689743}            The continuous nerve block infusion may not block all of the pain from your surgery so it is important that you take the pain medicines prescribed by your surgeon if you need them.      If you continue to have difficulty with your pain control, please page or call the anesthesiologist.  Caring for Your Pump at Home    Wear the pump on the outside of clothing - away from your skin and cold therapy (ice packs).  The delivery rate is accurate only at room temperature.    Make sure the white clamp on the tubing remains open (moves freely on the tubing).    Make sure there are no kinks in the tubing.    Do not tape or cover up the filter.    Protect the pump from sunlight and heat.    When sleeping:   o Do not place the pump underneath the bed covers where the pump may become too warm.  o Do not place the pump on the floor or hang the pump on a bed post as these situations may cause the tubing to get tangled and get pulled out.    Bathing/Showering:    o We recommend taking sponge baths until the pump is removed.  o It is important to not get the area where the tube enters your body wet.    It is normal for a small amount of fluid to leak from the hole in your body where the tube is inserted.  If this occurs, reinforce the bandage with another bandage.  The Infusion    Do not turn the infusion off unless your anesthesiologist has told you to do so.    Do not change the flow rate of the infusion unless your anesthesiologist told you to do so.  Changing the flow rate without your doctor s instruction may result in the wrong  dose of medicine, which could cause serious injury.    If your anesthesiologist told you to change the rate of your infusion:  o Flip open the clear cover.  o Turn the white key on the select-a-flow dial to the instructed rate.  (The rate of the infusion should line up with the black arrow at the top of the controller.)    o Listen for the click when you move the dial.  Removing the Tubing    When the pump is empty or if you have been told to stop the infusion you can remove the tubing.  Follow these steps:  o Wash your hands.  o Clamp the tubing (squeeze the white clamp until you hear or feel a click).  o Remove the clear dressing that covers the tubing.  o Grasp the tubing close to the skin and gently pull.  If you meet resistance, stop pulling and page or call your anesthesiologist.  o DO NOT cut or forcefully remove the tubing.  o After removal, check the end of the tubing for a dark tip.  If you do not see a dark tip, page or call your anesthesiologist.  o Apply firm pressure over the site until oozing stops.  Wash the area with soap and water, dry with a clean towel and then cover with a bandage.      The pump is not reusable. Dispose of it in the trash and wash your hands.   Troubleshooting    Tubing Comes Out From Skin:  If the tube accidentally comes out, check the end of the tube for a dark tip.    If you see a dark tip simply discard it and use the pain pills prescribed to you by your surgeon.    If you don t see a dark tip, page or call the anesthesiologist.    Tubing Disconnection:  If the tubing accidentally becomes disconnected from the pump, DO NOT reconnect the pump to the tubing.  It may have been contaminated with germs.  Close the tubing clamp and immediately page or call your anesthesiologist.    Fluid Leaking:  If enough fluid is leaking from the pump or the tubing outside your body to soak through the dressing, close the white clamp on the tubing and page or call your anesthesiologist.     Immediately report the following to your anesthesiologist:    Redness, warmth, swelling, or tenderness at the site the tubing was inserted    Increase in pain    Fever, chills, sweats    Bowel or bladder changes    Difficulty breathing    Dizziness, lightheadedness    Blurred vision    Ringing or buzzing in your ears    Metal taste in your mouth    Numbness and/or tingling around your mouth, fingers or toes    Drowsiness    Confusion    Trouble removing the tubing    Dark tip is not present when tubing is removed         Notifying your Anesthesiologist  o Page:  Dial [phone], then enter [phone].  You will be prompted to enter your phone number and then the # sign.  The anesthesiologist will call you back.  o Call:  Dial [phone].  Ask to speak to the anesthesiologist on call for the [facility].         Caring for Your Ector-Glover Drain    You have been discharged with a Ector-Glovre drainage tube. This tube drains fluid from your incision, helping prevent swelling and reducing the risk for infection. The tube is held in place by a few stitches. The drain will be removed when your doctor determines you no longer need it and when the amount of drainage decreases. The color and amount of fluid varies. Right after surgery, the fluid may be bright red and may become clearer over time.   Dressing:    Keep the skin around the tube dry.    If you have a dressing, change it every day.   o Wash your hands.  o Remove the old bandage. Do not use scissors-you may accidentally cut the tube.  o Check for any redness, swelling, drainage, or broken stitches. (Call your doctor with any of these findings).  o Wash your hands again.  o Wet a cotton swab (Q-tip) and clean around the incision and the tube site. Use normal saline solution (salt and water) or soap and water. Start at the tube site and move outward in a circular motion.   o Pat dry.  o Put a new bandage on the incision and tube site. Make  the bandage large enough to cover the whole incision area.   o Tape the bandage in place.  o Throw out old materials and wash your hands.   Home Care:    Tape the tube to the skin below the bandage. Make sure to keep some slack in the tube to keep it from pulling out.     DO NOT sleep on the same side as the tube.    Secure the tube and bulb inside your clothing with a safety pin. This helps keep the tube from being pulled out.     Keep the bulb compressed at all times, except when you empty it.    Empty your drain at least twice a day. Empty it more often if the drain is full.   o Lift the opening of the drain.  o Drain the fluid into a measuring cup.  o Record the amount of fluid each time you empty. Share the information with your doctor at your follow-up visit.   o Squeeze the bulb with your hands until you hear air coming out of the bulb.  o Close the opening.     Tape plastic wrap over the bandage and tube site when you shower.      Stripping  the tube helps keep blood clots from blocking the tube.                         ONLY DO THIS IF YOUR MD INSTRUCTED YOU TO DO SO!  o Hold the tubing where it leaves the skin with one hand. This keeps it from pulling on the skin.  o Pinch the tubing with the thumb and first finger of your other hand.   o Slowly and firmly pull your thumb and first finger down the tube (squeezing the tube between your fingers). Keep squeezing the tube as you run your fingers towards the bulb. If the pulling hurts or feels like it is coming out of the skin, STOP. Begin again more gently.  o Let go of the tubing with both hands. If the tube is still blocked, repeat these steps three or four times. Make sure that the bulb is compressed so it creates suction.  When to call your doctor:    New or increased pain around the tube    Redness, warmth, or swelling around the incision or tube    Drainage that is foul smelling    Vomiting    Fever over 101 F degrees    Fluid leaking around the  tube    Incision seems not to be healing    Stitches become loose    The tube falls out    Drainage that changes from light pick to dark red    A sudden increase or decrease in the amount of drainage (over 30 ml).  Your drainage record:  Date Time Bulb 1: Amount of drainage (ml or cc) Bulb 2: Amount of drainage (ml or cc) Notes                                                                                            Rev. 4/2014      Scopolamine Patch  (Absorbed through the skin)    The Scopolamine Patch prevents nausea and vomiting caused by motion sickness or anesthesia and surgery in adults.    Brand Name(s): Transderm Scop, Transderm-Scope  There may be other brand names for this medicine.    When This Medicine Should Not Be Used:  You should not use this medicine if you have had an allergic reaction to scopolamine, or if you have narrow angle glaucoma.    How to Use This Medicine:    Your doctor will tell you how many patches to use, where to apply them, and how often to apply them.     Do not use more patches or apply them more often than your doctor tells you to.    This medicine comes with patient instructions. Read and follow these instructions carefully. Ask your doctor or pharmacist if you have any questions.    To prevent motion sickness, apply the patch at least 4 hours before you need it.    Wash and dry your hands thoroughly before applying the patch.    Leave the patch in its sealed wrapper until you are ready to put it on. Tear the wrapper open carefully. NEVER CUT the wrapper or the patch with scissors. Do not use any patch that has been cut by accident.    Take the liner off the sticky side before applying.    Apply the patch to dry, hairless skin behind the ear.    If the patch is loose or falls off, apply a new patch at a different place behind the ear.    After you take off the patch, wash the place where the patch was and your hands thoroughly.    Only one patch should be used at any  time.    If a dose is missed:  If you forget to wear or change a patch, put one on as soon as you can. If it is almost time to put on your next patch, wait until then to apply a new patch and skip the one you missed. Do not apply extra patches to make up for a missed dose.    How to Store and Dispose of This Medicine:    Store the patches at room temperature in a closed container, away from heat, moisture and direct light.    Fold the used patch in half with the sticky sides together. Throw any used patch away so that children or pets cannot get to it. You will also need to throw away old patches after the expiration date has passed.    Keep all medicine away from children and never share your medicine with anyone.    Ask your doctor or pharmacist before using any other medicine, including over-the-counter medicines, vitamins, and herbal products.  Tell your doctor if you are using any medicines that make you sleepy. These include sleeping pills, cold and allergy medicine, narcotic pain relievers and sedatives.     Tell your doctor if you are using any medicine that make you sleepy. These include sleeping pills, bo and allergy medicine, narcotic pain relievers and sedatives.    Do not drink alcohol while you are using this medicine.     Warnings While Using This Medicine:    Make sure your doctor knows if you are pregnant or breastfeeding or if you have glaucoma, prostate problems, trouble urinating, blocked bowels, liver disease, kidney disease or a history of seizures or mental illness.    This medicine can cause blurring of vision and other vision problems if it comes in contact with the eyes. This medicine may also cause problems with urination. If any of these reactions occur, remove the patch and call your doctor right away.    This medicine may make you dizzy or drowsy. Avoid driving, using machines, or doing anything else that could be dangerous if you are not alert. If you plan to participate in  underwater sports, this medicine may cause disorienting effects. If this is a concern for you, talk with your doctor.    This medicine may make you sweat less and cause your body to get too hot. Be careful in hot weather, when you are exercising or if using sauna or whirlpool.    Make sure any doctor or dentist who treats you knows that you are using this medicine. This medicine may affect the results of certain medical tests.    Skin burns have been reported at the patch site in several patients wearing an aluminized transdermal system during a magnetic resonance imaging scan (MRI). Because Transderm Scop contains aluminum, it is recommended to remove the system before undergoing an MRI.    Call your doctor right away if you notice any of these side effects:    Allergic reaction: Itching or hives, swelling in your face or hands, swelling or tingling in your mouth or throat, chest tightness, trouble breathing    Blurred vision    Confusion or memory loss    Fast, slow or uneven heartbeat    Lightheadedness, dizziness, drowsiness or fainting    Seeing, hearing or feeling things that are not there    Severe eye pain    Trouble urinating    If you notice these less serious side effects, talk with your doctor:    Dry mouth    Dry, itchy or red eyes    Restlessness    Skin rash or redness    If you notice other side effects that you think are caused by this medicine, tell your doctor immediately.    Rev. 4/2014      Same-Day Surgery   Adult Discharge Orders & Instructions     For 24 hours after surgery:  1. Get plenty of rest.  A responsible adult must stay with you for at least 24 hours after you leave the hospital.   2. Pain medication can slow your reflexes. Do not drive or use heavy equipment.  If you have weakness or tingling, don't drive or use heavy equipment until this feeling goes away.  3. Mixing alcohol and pain medication can cause dizziness and slow your breathing. It can even be fatal. Do not drink alcohol  while taking pain medication.  4. Avoid strenuous or risky activities.  Ask for help when climbing stairs.   5. You may feel lightheaded.  If so, sit for a few minutes before standing.  Have someone help you get up.   6. If you have nausea (feel sick to your stomach), drink only clear liquids such as apple juice, ginger ale, broth or 7-Up.  Rest may also help.  Be sure to drink enough fluids.  Move to a regular diet as you feel able. Take pain medications with a small amount of solid food, such as toast or crackers, to avoid nausea.   7. A slight fever is normal. Call the doctor if your fever is over 100 F (37.7 C) (taken under the tongue) or lasts longer than 24 hours.  8. You may have a dry mouth, muscle aches, trouble sleeping or a sore throat.  These symptoms should go away after 24 hours.  9. Do not make important or legal decisions.   Pain Management:      1. Take pain medication (if prescribed) for pain as directed by your physician.        2. WARNING: If the pain medication you have been prescribed contains Tylenol (acetaminophen), DO NOT take additional doses of Tylenol (acetaminophen).     Call your doctor for any of the followin.  Signs of infection (fever, growing tenderness at the surgery site, severe pain, a large amount of drainage or bleeding, foul-smelling drainage, redness, swelling).    2.  It has been over 8 to 10 hours since surgery and you are still not able to urinate (pee).    3.  Headache for over 24 hours.    4.  Numbness, tingling or weakness the day after surgery (if you had spinal anesthesia).  To contact a doctor, call _____________________________________ or:      [phone] and ask for the Resident On Call for:          __________________________________________ (answered 24 hours a day)      Emergency Department:  [facility]: [phone]  [facility]: [phone]               Rev. 10/2014       Additional Information     If you use  "hormonal birth control (such as the pill, patch, ring or implants): You'll need a second form of birth control for 7 days (condoms, a diaphragm or contraceptive foam). While in the hospital, you received a medicine called Bridion. Your normal birth control will not work as well for a week after taking this medicine.          Pending Results     Date and Time Order Name Status Description    3/19/2018 0854 Surgical pathology exam In process             Statement of Approval     Ordered          18 1212  I have reviewed and agree with all the recommendations and orders detailed in this document.  EFFECTIVE NOW     Approved and electronically signed by:  Irma Del Castillo MD             Admission Information     Date & Time Provider Department Dept. Phone    3/19/2018 Ely Ackerman MD  PACU [phone]      Your Vitals Were     Blood Pressure Pulse Temperature Respirations Height Weight    108/73 98 98.2  F (36.8  C) (Oral) 16 1.67 m (5' 5.75\") 106 kg (233 lb 11 oz)    Last Period Pulse Oximetry BMI (Body Mass Index)             2018 99% 38.01 kg/m2         [facility] Information     [facility] lets you send messages to your doctor, view your test results, renew your prescriptions, schedule appointments and more. To sign up, go to [email or web address] . Click on \"Log in\" on the left side of the screen, which will take you to the Welcome page. Then click on \"Sign up Now\" on the right side of the page.     You will be asked to enter the access code listed below, as well as some personal information. Please follow the directions to create your username and password.     Your access code is: [email or web address]  Expires: 2018  7:30 AM     Your access code will  in 90 days. If you need help or a new code, please call your [facility] clinic or [phone].        Care EveryWhere ID     This is your Care EveryWhere ID. This could be used by other organizations to access your [facility] medical " records  [id number]        Equal Access to Services     [facility] IRAJ : Hadii mode ricardo roderickkatlyn Gaming, waaxda lumameadaha, qaybta dillonasterriley saxena, dolores ramsoniyaradha mai. [facility] [phone].    ATENCIÓN: Si habla español, tiene a galindo disposición servicios gratuitos de asistencia lingüística. Llame al [phone].    We comply with applicable federal civil rights laws and [place] laws. We do not discriminate on the basis of race, color, national origin, age, disability, sex, sexual orientation, or gender identity.               Review of your medicines      START taking        Dose / Directions    azithromycin 250 MG tablet   Commonly known as:  ZITHROMAX   Used for:  S/P bilateral mastectomy        Two tablets first day, then one tablet daily for four days.   Quantity:  6 tablet   Refills:  0       hydrOXYzine 25 MG tablet   Commonly known as:  ATARAX   Used for:  S/P bilateral mastectomy        Dose:  25-50 mg   Take 1-2 tablets (25-50 mg) by mouth every 6 hours as needed for itching   Quantity:  20 tablet   Refills:  1       oxyCODONE IR 5 MG tablet   Commonly known as:  ROXICODONE   Used for:  S/P bilateral mastectomy        Dose:  5 mg   Take 1 tablet (5 mg) by mouth every 4 hours as needed for pain maximum 8 tablet(s) per day   Quantity:  50 tablet   Refills:  0         CONTINUE these medicines which may have CHANGED, or have new prescriptions. If we are uncertain of the size of tablets/capsules you have at home, strength may be listed as something that might have changed.        Dose / Directions    * ONDANSETRON PO   This may have changed:  Another medication with the same name was added. Make sure you understand how and when to take each.        Dose:  4 mg   Take 4 mg by mouth every 6 hours as needed for nausea   Refills:  0       * ondansetron 4 MG ODT tab   Commonly known as:  ZOFRAN ODT   This may have changed:  You were already taking a medication with the same name, and this  prescription was added. Make sure you understand how and when to take each.   Used for:  S/P bilateral mastectomy        Dose:  4-8 mg   Take 1-2 tablets (4-8 mg) by mouth every 8 hours as needed for nausea   Quantity:  20 tablet   Refills:  1       * Notice:  This list has 2 medication(s) that are the same as other medications prescribed for you. Read the directions carefully, and ask your doctor or other care provider to review them with you.      CONTINUE these medicines which have NOT CHANGED        Dose / Directions    divalproex sodium delayed-release 250 MG DR tablet   Commonly known as:  DEPAKOTE   Used for:  Intractable absence seizures (H)        Dose:  750 mg   Take 3 tablets by mouth 2 times daily.   Quantity:  270 tablet   Refills:  1       fluticasone 50 MCG/ACT spray   Commonly known as:  FLONASE   Used for:  Allergic rhinitis        Dose:  1 spray   1 spray by Both Nostrils route 2 times daily. 1 spray each nostril AM & PM   Quantity:  1 Package   Refills:  11       loratadine 10 MG tablet   Commonly known as:  CLARITIN        Dose:  10 mg   Take 10 mg by mouth daily   Refills:  0       PROAIR  (90 BASE) MCG/ACT Inhaler   Used for:  Cough   Generic drug:  albuterol        Dose:  2 puff   Inhale 2 puffs into the lungs every 2 hours as needed.   Quantity:  3 Inhaler   Refills:  0       testosterone cypionate 200 MG/ML injection   Commonly known as:  DEPOTESTOTERONE        Dose:  80 mg   Inject 80 mg into the muscle   Refills:  0       TYLENOL PO        Dose:  160 mg   Take 160 mg by mouth every 4 hours as needed for mild pain or fever   Refills:  0            Where to get your medicines      These medications were sent to [facility] [place], [place] - [address]  [address] [address]     Phone:  [phone]     azithromycin 250 MG tablet    hydrOXYzine 25 MG tablet    ondansetron 4 MG ODT tab         Some of these will need a paper prescription and  others can be bought over the counter. Ask your nurse if you have questions.     Bring a paper prescription for each of these medications     oxyCODONE IR 5 MG tablet                Protect others around you: Learn how to safely use, store and throw away your medicines at www.disposemymeds.org.        ANTIBIOTIC INSTRUCTION     You've Been Prescribed an Antibiotic - Now What?  Your healthcare team thinks that you or your loved one might have an infection. Some infections can be treated with antibiotics, which are powerful, life-saving drugs. Like all medications, antibiotics have side effects and should only be used when necessary. There are some important things you should know about your antibiotic treatment.      Your healthcare team may run tests before you start taking an antibiotic.    Your team may take samples (e.g., from your blood, urine or other areas) to run tests to look for bacteria. These test can be important to determine if you need an antibiotic at all and, if you do, which antibiotic will work best.      Within a few days, your healthcare team might change or even stop your antibiotic.    Your team may start you on an antibiotic while they are working to find out what is making you sick.    Your team might change your antibiotic because test results show that a different antibiotic would be better to treat your infection.    In some cases, once your team has more information, they learn that you do not need an antibiotic at all. They may find out that you don't have an infection, or that the antibiotic you're taking won't work against your infection. For example, an infection caused by a virus can't be treated with antibiotics. Staying on an antibiotic when you don't need it is more likely to be harmful than helpful.      You may experience side effects from your antibiotic.    Like all medications, antibiotics have side effects. Some of these can be serious.    Let you healthcare team know if you  have any known allergies when you are admitted to the hospital.    One significant side effect of nearly all antibiotics is the risk of severe and sometimes deadly diarrhea caused by Clostridium difficile (C. Difficile). This occurs when a person takes antibiotics because some good germs are destroyed. Antibiotic use allows C. diificile to take over, putting patients at high risk for this serious infection.    As a patient or caregiver, it is important to understand your or your loved one's antibiotic treatment. It is especially important for caregivers to speak up when patients can't speak for themselves. Here are some important questions to ask your healthcare team.    What infection is this antibiotic treating and how do you know I have that infection?    What side effects might occur from this antibiotic?    How long will I need to take this antibiotic?    Is it safe to take this antibiotic with other medications or supplements (e.g., vitamins) that I am taking?     Are there any special directions I need to know about taking this antibiotic? For example, should I take it with food?    How will I be monitored to know whether my infection is responding to the antibiotic?    What tests may help to make sure the right antibiotic is prescribed for me?      Information provided by:  www.cdc.gov/getsmart  U.S. Department of Health and Human Services  Centers for disease Control and Prevention  National Center for Emerging and Zoonotic Infectious Diseases  Division of Healthcare Quality Promotion        Information about OPIOIDS     PRESCRIPTION OPIOIDS: WHAT YOU NEED TO KNOW    Prescription opioids can be used to help relieve moderate to severe pain and are often prescribed following a surgery or injury, or for certain health conditions. These medications can be an important part of treatment but also come with serious risks. It is important to work with your health care provider to make sure you are getting the  safest, most effective care.    WHAT ARE THE RISKS AND SIDE EFFECTS OF OPIOID USE?  Prescription opioids carry serious risks of addiction and overdose, especially with prolonged use. An opioid overdose, often marked by slowed breathing can cause sudden death. The use of prescription opioids can have a number of side effects as well, even when taken as directed:      Tolerance - meaning you might need to take more of a medication for the same pain relief    Physical dependence - meaning you have symptoms of withdrawal when a medication is stopped    Increased sensitivity to pain    Constipation    Nausea, vomiting, and dry mouth    Sleepiness and dizziness    Confusion    Depression    Low levels of testosterone that can result in lower sex drive, energy, and strength    Itching and sweating    RISKS ARE GREATER WITH:    History of drug misuse, substance use disorder, or overdose    Mental health conditions (such as depression or anxiety)    Sleep apnea    Older age (65 years or older)    Pregnancy    Avoid alcohol while taking prescription opioids.   Also, unless specifically advised by your health care provider, medications to avoid include:    Benzodiazepines (such as Xanax or Valium)    Muscle relaxants (such as Soma or Flexeril)    Hypnotics (such as Ambien or Lunesta)    Other prescription opioids    KNOW YOUR OPTIONS:  Talk to your health care provider about ways to manage your pain that do not involve prescription opioids. Some of these options may actually work better and have fewer risks and side effects:    Pain relievers such as acetaminophen, ibuprofen, and naproxen    Some medications that are also used for depression or seizures    Physical therapy and exercise    Cognitive behavioral therapy, a psychological, goal-directed approach, in which patients learn how to modify physical, behavioral, and emotional triggers of pain and stress    IF YOU ARE PRESCRIBED OPIOIDS FOR PAIN:    Never take opioids in  greater amounts or more often than prescribed    Follow up with your primary health care provider and work together to create a plan on how to manage your pain.    Talk about ways to help manage your pain that do not involve prescription opioids    Talk about all concerns and side effects    Help prevent misuse and abuse    Never sell or share prescription opioids    Never use another person's prescription opioids    Store prescription opioids in a secure place and out of reach of others (this may include visitors, children, friends, and family)    Visit www.cdc.gov/drugoverdose to learn about risks of opioid abuse and overdose    If you believe you may be struggling with addiction, tell your health care provider and ask for guidance or call [facility]'s National Helpline at 7-302-849-HELP    LEARN MORE / www.cdc.gov/drugoverdose/prescribing/guideline.html    Safely dispose of unused prescription opioids: Find your local drug take-back programs and more information about the importance of safe disposal at www.doseofreality.[place].gov             Medication List: This is a list of all your medications and when to take them. Check marks below indicate your daily home schedule. Keep this list as a reference.      Medications           Morning Afternoon Evening Bedtime As Needed    azithromycin 250 MG tablet   Commonly known as:  ZITHROMAX   Two tablets first day, then one tablet daily for four days.                                divalproex sodium delayed-release 250 MG DR tablet   Commonly known as:  DEPAKOTE   Take 3 tablets by mouth 2 times daily.                                fluticasone 50 MCG/ACT spray   Commonly known as:  FLONASE   1 spray by Both Nostrils route 2 times daily. 1 spray each nostril AM & PM                                hydrOXYzine 25 MG tablet   Commonly known as:  ATARAX   Take 1-2 tablets (25-50 mg) by mouth every 6 hours as needed for itching                                loratadine 10 MG tablet    Commonly known as:  CLARITIN   Take 10 mg by mouth daily                                * ONDANSETRON PO   Take 4 mg by mouth every 6 hours as needed for nausea                                * ondansetron 4 MG ODT tab   Commonly known as:  ZOFRAN ODT   Take 1-2 tablets (4-8 mg) by mouth every 8 hours as needed for nausea                                oxyCODONE IR 5 MG tablet   Commonly known as:  ROXICODONE   Take 1 tablet (5 mg) by mouth every 4 hours as needed for pain maximum 8 tablet(s) per day                                PROAIR  (90 BASE) MCG/ACT Inhaler   Inhale 2 puffs into the lungs every 2 hours as needed.   Generic drug:  albuterol                                testosterone cypionate 200 MG/ML injection   Commonly known as:  DEPOTESTOTERONE   Inject 80 mg into the muscle                                TYLENOL PO   Take 160 mg by mouth every 4 hours as needed for mild pain or fever   Last time this was given:  975 mg on 3/19/2018  6:41 AM                                * Notice:  This list has 2 medication(s) that are the same as other medications prescribed for you. Read the directions carefully, and ask your doctor or other care provider to review them with you.

## 2018-03-19 NOTE — ANESTHESIA POSTPROCEDURE EVALUATION
Patient: Luisana Quinonez    Procedure(s):  Bilateral Subcutaneous Mastectomy With Nipple Grafts, ONQ - Wound Class: I-Clean    Diagnosis:Transgender/Gender Dysphoria  Diagnosis Additional Information: No value filed.    Anesthesia Type:  General, ETT    Note:  Anesthesia Post Evaluation    Patient location during evaluation: PACU  Patient participation: Able to fully participate in evaluation  Level of consciousness: awake  Pain management: adequate  Airway patency: patent  Cardiovascular status: acceptable  Respiratory status: acceptable  Hydration status: acceptable  PONV: none     Anesthetic complications: None          Last vitals:  Vitals:    03/19/18 1300 03/19/18 1315 03/19/18 1330   BP: 115/72 101/62 107/71   Pulse:      Resp: 12 11 12   Temp:      SpO2: 99% 94% 95%         Electronically Signed By: Susan Diamond MD  March 19, 2018  1:42 PM

## 2018-03-19 NOTE — OR NURSING
Patient complaining of difficulty catching his breath, per Dr. Dash bingham to give albuterol nebulizer.

## 2018-03-19 NOTE — IP AVS SNAPSHOT
[facility]    [address] [address]    Phone:  [phone]                                       After Visit Summary   3/19/2018    Luisana Quinonez    MRN: [id number]           After Visit Summary Signature Page     I have received my discharge instructions, and my questions have been answered. I have discussed any challenges I see with this plan with the nurse or doctor.    ..........................................................................................................................................  Patient/Patient Representative Signature      ..........................................................................................................................................  Patient Representative Print Name and Relationship to Patient    ..................................................               ................................................  Date                                            Time    ..........................................................................................................................................  Reviewed by Signature/Title    ...................................................              ..............................................  Date                                                            Time

## 2018-03-19 NOTE — ANESTHESIA PREPROCEDURE EVALUATION
Anesthesia Evaluation     .             ROS/MED HX    ENT/Pulmonary:     (+)Intermittent asthma (Resolved by mid Feb.  No current issues.) Last exacerbation: early Feb 2018,Treatment: Inhaler prn,  , . .    Neurologic:     (+)seizures last seizure: 2014 features: Absence,     Cardiovascular:  - neg cardiovascular ROS       METS/Exercise Tolerance:  >4 METS   Hematologic:         Musculoskeletal:  - neg musculoskeletal ROS       GI/Hepatic:  - neg GI/hepatic ROS       Renal/Genitourinary:  - ROS Renal section negative       Endo:  - neg endo ROS       Psychiatric:  - neg psychiatric ROS       Infectious Disease:  - neg infectious disease ROS       Malignancy:      - no malignancy   Other: Comment: Pt declines HCG test.   - neg other ROS                 Physical Exam  Normal systems: cardiovascular, pulmonary and dental    Airway   Mallampati: II  TM distance: >3 FB  Neck ROM: full    Dental     Cardiovascular   Rhythm and rate: regular and normal      Pulmonary    breath sounds clear to auscultation                    Anesthesia Plan      History & Physical Review  History and physical reviewed and following examination; no interval change.    ASA Status:  2 .    NPO Status:  > 2 hours and > 8 hours    Plan for General and ETT with Intravenous and Propofol induction. Maintenance will be Balanced.    PONV prophylaxis:  Ondansetron (or other 5HT-3) and Dexamethasone or Solumedrol  Reviewed GA risks. No family history of anesthesia issues.  All questions answered.  Pt agrees with plan and wishes to proceed.        Postoperative Care  Postoperative pain management:  IV analgesics and Oral pain medications.      Consents  Anesthetic plan, risks, benefits and alternatives discussed with:  Patient..                          .

## 2018-03-19 NOTE — DISCHARGE INSTRUCTIONS
ON-Q  C-bloc Continuous Nerve Block Discharge Instructions  The Nerve Block: {UR ON Q Blocks:370459}      Your anesthesiologist performed a nerve block (a procedure that blocks pain to only a specific area) by inserting a small tube in your body.  This tube is connected to a pump that will help control your pain.    The pump is shaped like a balloon and is filled with medicine that causes numbness or loss of sensation to help control your pain around the incision or site of injury.  The pain pump DOES NOT contain controlled substances.      The medicine in the pump may alter your ability to feel changes in temperature or pressure. Your doctor will tell you if any special precautions apply to you.       The Pump      DO NOT SQUEEZE THE PUMP.     The pump delivers medicine at a very slow rate.    You will NOT see the medicine moving through the tubing.    As the medicine is delivered, the pump ball will slowly become smaller.    It may take a day or so before you notice a change in the size and look of the pump.    Depending on the size of your pump, it may take 2 - 5 days to give all the medicine.    The middle part of the pump may look like an apple core when empty.  Managing Your Pain  The Medicine and Infusion Rate:   {onq medications:651193} {onq med dosing rate:119253}            The continuous nerve block infusion may not block all of the pain from your surgery so it is important that you take the pain medicines prescribed by your surgeon if you need them.      If you continue to have difficulty with your pain control, please page or call the anesthesiologist.  Caring for Your Pump at Home    Wear the pump on the outside of clothing - away from your skin and cold therapy (ice packs).  The delivery rate is accurate only at room temperature.    Make sure the white clamp on the tubing remains open (moves freely on the tubing).    Make sure there are no kinks in the tubing.    Do not tape or cover up the  filter.    Protect the pump from sunlight and heat.    When sleeping:   o Do not place the pump underneath the bed covers where the pump may become too warm.  o Do not place the pump on the floor or hang the pump on a bed post as these situations may cause the tubing to get tangled and get pulled out.    Bathing/Showering:    o We recommend taking sponge baths until the pump is removed.  o It is important to not get the area where the tube enters your body wet.    It is normal for a small amount of fluid to leak from the hole in your body where the tube is inserted.  If this occurs, reinforce the bandage with another bandage.  The Infusion    Do not turn the infusion off unless your anesthesiologist has told you to do so.    Do not change the flow rate of the infusion unless your anesthesiologist told you to do so.  Changing the flow rate without your doctor s instruction may result in the wrong dose of medicine, which could cause serious injury.    If your anesthesiologist told you to change the rate of your infusion:  o Flip open the clear cover.  o Turn the white key on the select-a-flow dial to the instructed rate.  (The rate of the infusion should line up with the black arrow at the top of the controller.)    o Listen for the click when you move the dial.  Removing the Tubing    When the pump is empty or if you have been told to stop the infusion you can remove the tubing.  Follow these steps:  o Wash your hands.  o Clamp the tubing (squeeze the white clamp until you hear or feel a click).  o Remove the clear dressing that covers the tubing.  o Grasp the tubing close to the skin and gently pull.  If you meet resistance, stop pulling and page or call your anesthesiologist.  o DO NOT cut or forcefully remove the tubing.  o After removal, check the end of the tubing for a dark tip.  If you do not see a dark tip, page or call your anesthesiologist.  o Apply firm pressure over the site until oozing stops.  Wash the  area with soap and water, dry with a clean towel and then cover with a bandage.      The pump is not reusable. Dispose of it in the trash and wash your hands.   Troubleshooting    Tubing Comes Out From Skin:  If the tube accidentally comes out, check the end of the tube for a dark tip.    If you see a dark tip simply discard it and use the pain pills prescribed to you by your surgeon.    If you don t see a dark tip, page or call the anesthesiologist.    Tubing Disconnection:  If the tubing accidentally becomes disconnected from the pump, DO NOT reconnect the pump to the tubing.  It may have been contaminated with germs.  Close the tubing clamp and immediately page or call your anesthesiologist.    Fluid Leaking:  If enough fluid is leaking from the pump or the tubing outside your body to soak through the dressing, close the white clamp on the tubing and page or call your anesthesiologist.    Immediately report the following to your anesthesiologist:    Redness, warmth, swelling, or tenderness at the site the tubing was inserted    Increase in pain    Fever, chills, sweats    Bowel or bladder changes    Difficulty breathing    Dizziness, lightheadedness    Blurred vision    Ringing or buzzing in your ears    Metal taste in your mouth    Numbness and/or tingling around your mouth, fingers or toes    Drowsiness    Confusion    Trouble removing the tubing    Dark tip is not present when tubing is removed         Notifying your Anesthesiologist  o Page:  Dial [phone], then enter [phone].  You will be prompted to enter your phone number and then the # sign.  The anesthesiologist will call you back.  o Call:  Dial [phone].  Ask to speak to the anesthesiologist on call for the [facility].         Caring for Your Ector-Glover Drain    You have been discharged with a Ector-Glover drainage tube. This tube drains fluid from your incision, helping prevent swelling and reducing the risk for  infection. The tube is held in place by a few stitches. The drain will be removed when your doctor determines you no longer need it and when the amount of drainage decreases. The color and amount of fluid varies. Right after surgery, the fluid may be bright red and may become clearer over time.   Dressing:    Keep the skin around the tube dry.    If you have a dressing, change it every day.   o Wash your hands.  o Remove the old bandage. Do not use scissors-you may accidentally cut the tube.  o Check for any redness, swelling, drainage, or broken stitches. (Call your doctor with any of these findings).  o Wash your hands again.  o Wet a cotton swab (Q-tip) and clean around the incision and the tube site. Use normal saline solution (salt and water) or soap and water. Start at the tube site and move outward in a circular motion.   o Pat dry.  o Put a new bandage on the incision and tube site. Make the bandage large enough to cover the whole incision area.   o Tape the bandage in place.  o Throw out old materials and wash your hands.   Home Care:    Tape the tube to the skin below the bandage. Make sure to keep some slack in the tube to keep it from pulling out.     DO NOT sleep on the same side as the tube.    Secure the tube and bulb inside your clothing with a safety pin. This helps keep the tube from being pulled out.     Keep the bulb compressed at all times, except when you empty it.    Empty your drain at least twice a day. Empty it more often if the drain is full.   o Lift the opening of the drain.  o Drain the fluid into a measuring cup.  o Record the amount of fluid each time you empty. Share the information with your doctor at your follow-up visit.   o Squeeze the bulb with your hands until you hear air coming out of the bulb.  o Close the opening.     Tape plastic wrap over the bandage and tube site when you shower.      Stripping  the tube helps keep blood clots from blocking the tube.                          ONLY DO THIS IF YOUR MD INSTRUCTED YOU TO DO SO!  o Hold the tubing where it leaves the skin with one hand. This keeps it from pulling on the skin.  o Pinch the tubing with the thumb and first finger of your other hand.   o Slowly and firmly pull your thumb and first finger down the tube (squeezing the tube between your fingers). Keep squeezing the tube as you run your fingers towards the bulb. If the pulling hurts or feels like it is coming out of the skin, STOP. Begin again more gently.  o Let go of the tubing with both hands. If the tube is still blocked, repeat these steps three or four times. Make sure that the bulb is compressed so it creates suction.  When to call your doctor:    New or increased pain around the tube    Redness, warmth, or swelling around the incision or tube    Drainage that is foul smelling    Vomiting    Fever over 101 F degrees    Fluid leaking around the tube    Incision seems not to be healing    Stitches become loose    The tube falls out    Drainage that changes from light pick to dark red    A sudden increase or decrease in the amount of drainage (over 30 ml).  Your drainage record:  Date Time Bulb 1: Amount of drainage (ml or cc) Bulb 2: Amount of drainage (ml or cc) Notes                                                                                            Rev. 4/2014      Scopolamine Patch  (Absorbed through the skin)    The Scopolamine Patch prevents nausea and vomiting caused by motion sickness or anesthesia and surgery in adults.    Brand Name(s): Transderm Scop, Transderm-Scope  There may be other brand names for this medicine.    When This Medicine Should Not Be Used:  You should not use this medicine if you have had an allergic reaction to scopolamine, or if you have narrow angle glaucoma.    How to Use This Medicine:    Your doctor will tell you how many patches to use, where to apply them, and how often to apply them.     Do not use more patches or apply them more  often than your doctor tells you to.    This medicine comes with patient instructions. Read and follow these instructions carefully. Ask your doctor or pharmacist if you have any questions.    To prevent motion sickness, apply the patch at least 4 hours before you need it.    Wash and dry your hands thoroughly before applying the patch.    Leave the patch in its sealed wrapper until you are ready to put it on. Tear the wrapper open carefully. NEVER CUT the wrapper or the patch with scissors. Do not use any patch that has been cut by accident.    Take the liner off the sticky side before applying.    Apply the patch to dry, hairless skin behind the ear.    If the patch is loose or falls off, apply a new patch at a different place behind the ear.    After you take off the patch, wash the place where the patch was and your hands thoroughly.    Only one patch should be used at any time.    If a dose is missed:  If you forget to wear or change a patch, put one on as soon as you can. If it is almost time to put on your next patch, wait until then to apply a new patch and skip the one you missed. Do not apply extra patches to make up for a missed dose.    How to Store and Dispose of This Medicine:    Store the patches at room temperature in a closed container, away from heat, moisture and direct light.    Fold the used patch in half with the sticky sides together. Throw any used patch away so that children or pets cannot get to it. You will also need to throw away old patches after the expiration date has passed.    Keep all medicine away from children and never share your medicine with anyone.    Ask your doctor or pharmacist before using any other medicine, including over-the-counter medicines, vitamins, and herbal products.  Tell your doctor if you are using any medicines that make you sleepy. These include sleeping pills, cold and allergy medicine, narcotic pain relievers and sedatives.     Tell your doctor if you are  using any medicine that make you sleepy. These include sleeping pills, bo and allergy medicine, narcotic pain relievers and sedatives.    Do not drink alcohol while you are using this medicine.     Warnings While Using This Medicine:    Make sure your doctor knows if you are pregnant or breastfeeding or if you have glaucoma, prostate problems, trouble urinating, blocked bowels, liver disease, kidney disease or a history of seizures or mental illness.    This medicine can cause blurring of vision and other vision problems if it comes in contact with the eyes. This medicine may also cause problems with urination. If any of these reactions occur, remove the patch and call your doctor right away.    This medicine may make you dizzy or drowsy. Avoid driving, using machines, or doing anything else that could be dangerous if you are not alert. If you plan to participate in underwater sports, this medicine may cause disorienting effects. If this is a concern for you, talk with your doctor.    This medicine may make you sweat less and cause your body to get too hot. Be careful in hot weather, when you are exercising or if using sauna or whirlpool.    Make sure any doctor or dentist who treats you knows that you are using this medicine. This medicine may affect the results of certain medical tests.    Skin burns have been reported at the patch site in several patients wearing an aluminized transdermal system during a magnetic resonance imaging scan (MRI). Because Transderm Scop contains aluminum, it is recommended to remove the system before undergoing an MRI.    Call your doctor right away if you notice any of these side effects:    Allergic reaction: Itching or hives, swelling in your face or hands, swelling or tingling in your mouth or throat, chest tightness, trouble breathing    Blurred vision    Confusion or memory loss    Fast, slow or uneven heartbeat    Lightheadedness, dizziness, drowsiness or fainting    Seeing,  hearing or feeling things that are not there    Severe eye pain    Trouble urinating    If you notice these less serious side effects, talk with your doctor:    Dry mouth    Dry, itchy or red eyes    Restlessness    Skin rash or redness    If you notice other side effects that you think are caused by this medicine, tell your doctor immediately.    Rev. 4/2014      Same-Day Surgery   Adult Discharge Orders & Instructions     For 24 hours after surgery:  1. Get plenty of rest.  A responsible adult must stay with you for at least 24 hours after you leave the hospital.   2. Pain medication can slow your reflexes. Do not drive or use heavy equipment.  If you have weakness or tingling, don't drive or use heavy equipment until this feeling goes away.  3. Mixing alcohol and pain medication can cause dizziness and slow your breathing. It can even be fatal. Do not drink alcohol while taking pain medication.  4. Avoid strenuous or risky activities.  Ask for help when climbing stairs.   5. You may feel lightheaded.  If so, sit for a few minutes before standing.  Have someone help you get up.   6. If you have nausea (feel sick to your stomach), drink only clear liquids such as apple juice, ginger ale, broth or 7-Up.  Rest may also help.  Be sure to drink enough fluids.  Move to a regular diet as you feel able. Take pain medications with a small amount of solid food, such as toast or crackers, to avoid nausea.   7. A slight fever is normal. Call the doctor if your fever is over 100 F (37.7 C) (taken under the tongue) or lasts longer than 24 hours.  8. You may have a dry mouth, muscle aches, trouble sleeping or a sore throat.  These symptoms should go away after 24 hours.  9. Do not make important or legal decisions.   Pain Management:      1. Take pain medication (if prescribed) for pain as directed by your physician.        2. WARNING: If the pain medication you have been prescribed contains Tylenol (acetaminophen), DO NOT take  additional doses of Tylenol (acetaminophen).     Call your doctor for any of the followin.  Signs of infection (fever, growing tenderness at the surgery site, severe pain, a large amount of drainage or bleeding, foul-smelling drainage, redness, swelling).    2.  It has been over 8 to 10 hours since surgery and you are still not able to urinate (pee).    3.  Headache for over 24 hours.    4.  Numbness, tingling or weakness the day after surgery (if you had spinal anesthesia).  To contact a doctor, call _____________________________________ or:      [phone] and ask for the Resident On Call for:          __________________________________________ (answered 24 hours a day)      Emergency Department:  [place] [facility]: [phone]  [place] [facility]: [phone]               Rev. 10/2014

## 2018-03-19 NOTE — BRIEF OP NOTE
[facility], [place]    Brief Operative Note    Pre-operative diagnosis: Transgender/Gender Dysphoria  Post-operative diagnosis Same  Procedure: Procedure(s):  Bilateral Subcutaneous Mastectomy With Nipple Grafts, ONQ - Wound Class: I-Clean  Surgeon: Surgeon(s) and Role:     * Ely Ackerman MD - Primary     * Irma Del Castillo MD - Assist  Anesthesia: Combined General with Block   Estimated blood loss: 100 mL  Drains: Ector-Glover x2  Specimens:   ID Type Source Tests Collected by Time Destination   A : right breast Tissue Breast, Right SURGICAL PATHOLOGY EXAM Ely Ackerman MD 3/19/2018  8:36 AM    B : left breast Tissue Breast, Left SURGICAL PATHOLOGY EXAM Ely Ackerman MD 3/19/2018  9:00 AM      Findings:   None.  Complications: None.  Implants: None.

## 2018-03-21 LAB — COPATH REPORT: NORMAL

## 2018-03-26 NOTE — PROGRESS NOTES
PLASTICS PREOP    This 27 year old trans male is here for a preop visit.  He is scheduled for bilateral SQ mastectomies with nipple grafts on 3/19.   His H&P was done earlier today. His preop mammogram was negative.  He's here today with his fiance. They had a lot of frustrations with the process of getting to this point.    We talked about his absence seizures and allergies. We also talked about the shortage of Ropivicaine that we use for the OnQ pumps so he may be a candidate for a pectoralis block. He will have the opportunity to talk more with the anesthesia the day of surgery if he is interested in that alternative.    We discussed the possible risks and complications, as well as perioperative cares and limitations for his procedure.  Periop instructions included: NPO after midnight except for am meds with sip of water, preop shower with surgical soap. The events of the day of surgery were explained - including preop, intraop and postop phases of care. The patient wanted specific details about the surgical technique.  We also went over the possible risks and complications involved with this elective procedure. These include but are not limited to: infection, bleeding, hematoma/seroma formation, poor healing - including dehiscence, nipple graft loss, hypertrophic scarring. Altered chest sensation- either hypo or hypersensitive, residual deformities and asymmetries, possible further surgical revisions, possible injury to surrounding neurovascular and musculoskeletal structures, including intra-axillary or intra-thoracic, anesthetic risks such as DVT/PE or cardiopulmonary events.  Postop cares and limitations were discussed with relation to his home and work settings.    We talked about getting a postop letter verifying that he will have had irreversible surgery, but since he is getting  in August, he will wait until then to change his name and gender marker since it won't cost extra then.     Overall,  their questions and concerns were addressed. Any additional questions that might come up should be written down so we can discuss them before surgery.    Total time = 20 minutes, all spent educating the patient and his partner about upcoming procedure.

## 2018-03-27 ENCOUNTER — OFFICE VISIT (OUTPATIENT)
Dept: WOUND CARE | Facility: CLINIC | Age: 28
End: 2018-03-27
Payer: COMMERCIAL

## 2018-03-27 DIAGNOSIS — Z87.890 STATUS POST GENDER REASSIGNMENT SURGERY: Primary | ICD-10-CM

## 2018-03-27 NOTE — PROGRESS NOTES
Patient arrives s/p bilateral mastectomy for VCI drain removal and On-Q-pump tubing. Pt states that the drains have decreased output and drainage is less than 20 cc. Pt has no signs or symptoms of infections and is healing well. Sutures removed from drains and drains removed without difficulty. Ace bandage applied and to keep this on for 3 week.   Pt did undergo a nipple graft.     Nipple graft care: Removed the bolster dressing. Cleansed with  MicroKlenz.  Patted  nipples dry with gauze.  Adaptic with gauze and tegaderm over each nipple.  Right sided nipple had erythema and some more discomfort. Per dr Ackerman they will call if erythema doesn't improve over the next two days. We can do Z-pack then. Also pt would like to return to work on Friday but since he has a lot of discomfort he will let us know how he is doing on Thursday    Patient will follow up with Dr. Ackerman in 2-3 months.   Instructions given and printed.   Patty Klein NP was available for supervision of care if needed or if questions should arise and regarding plan of care. Camila Sorto RN [facility]

## 2018-03-27 NOTE — PATIENT INSTRUCTIONS
Post Transgender Mastectomy Instructions    May shower with water spray to your back for the first week.    Change dressing after showering or once daily.  Care of your new nipples:  1. Carefully remove the old dressing making sure it s not sticking to or lifting up the grafts. (if you feel it is sticking you can get it slightly damp by spraying the microklenz on the gauze to help it lift up).  2. Liberally spray a 4x4 gauze with microklenz spray then gently pat nipples with the wet gauze and allow to air dry.  Do Not Rub!  3. Cut a piece of adaptic (curity non adherent or Vaseline coated dressing) into 4 pieces.  (remember to save the leftover 2 pieces in a ziplock bag).  4. Place one piece of the cut adaptic over each nipple.  5. Fold a 2x2 gauze in half and place on top of the adaptic.  6. Carefully place the tegaderm protective cover over the top.  7. Change the dressing for the next 7 days.  8. Ace wrap for the next 7 days as well. (this is to help with any swelling).  You can add an extra 3 more days of dressing changes if you choose but you only need to cover the nipples for 7 days.     The glue strip over your incision will start to peel up and fall off after about 2 weeks but if after 3 weeks the glue strip is still in place you may need to help it come off in the shower.      Post-surgery Nipple FAQ S    1) Once I'm no longer using nipple dressings or the Ace wrap, do I just not put anything on my nipples?   No you don't have to.  Remember it is ok if each side is behaving differently in healing.   Should I be moisturizing them with anything, or is it better not to?    You can moisturize if you would like but usually not until after week 3.     2) When can I let the spray from my shower head hit my chest directly?   Once you are done with the nipple dressings you may shower like you normally do.  And should I be washing my chest with soap, or just rinsing it?   You may wash with soap but remember soap can  dry the skin and we don t recommend daily soap for your skin except for critical areas  After you are done with the nipple dressings, you may wash with soap but treat the nipples gently, so no washcloth or anything else rough for 8 weeks     3) When can I start wearing shirts that pull over my head instead of buttoning/zipping in the front?   As soon as it's not sore to do so, probably within 3-4 weeks.     4) I forget what you said about how much I can lift and when?   You will be on a 5 pound lifting restriction for 4 weeks.  Let your body be your guide, increase in 2 to 5lb increments. If frequent 50 pound lifting is typical, you can resume this again at 8 weeks from surgery     5) When can I start reaching my arms over my head?  After 2-3 weeks,  but gently.      6) When can I start swimming again?  After 6-8 weeks     7) I've been sleeping on my back, but I'm wondering when it's okay to sleep on my side.    After 3-4 weeks as tolerated.     8) When can I start to massage my scars?   Don t start until at least 3 weeks post op but then you can start gently massaging your scars whenever you like, though move in the direction of the incision.     9) What will reduce the scar?   Scars will lighten with time, approximately in one year. Sun exposure however will darken them so if you are concerned use sunscreen on the scars. Other scar reducing products are unproven but okay to try if you want.  Scarring depends on genetics, tension on the tissues during activities.    There are various options none proven and none covered by insurance.   Silicone strips - oils - vitamin E - Mederma - Frankincense, etc.      10) When are the nipples done sloughing?   Old nipple skin will peel off when new graft underneath has healed usually around 2-3 weeks post op.     11) What happens if there is drainage?   Keep clean and dry cover with gauze and bandaid. Ask about topical medications.    Always call the nurse at [phone] or  [phone] if you are concerned.

## 2018-03-27 NOTE — MR AVS SNAPSHOT
After Visit Summary   3/27/2018    Luisana Quinonez    MRN: [id number]           Patient Information     Date Of Birth          1990        Visit Information        Provider Department      3/27/2018 1:30 PM Camila Sorto RN [facility] Wound Ostomy        Today's Diagnoses     Status post gender reassignment surgery    -  1      Care Instructions    Post Transgender Mastectomy Instructions    May shower with water spray to your back for the first week.    Change dressing after showering or once daily.  Care of your new nipples:  1. Carefully remove the old dressing making sure it s not sticking to or lifting up the grafts. (if you feel it is sticking you can get it slightly damp by spraying the microklenz on the gauze to help it lift up).  2. Liberally spray a 4x4 gauze with microklenz spray then gently pat nipples with the wet gauze and allow to air dry.  Do Not Rub!  3. Cut a piece of adaptic (curity non adherent or Vaseline coated dressing) into 4 pieces.  (remember to save the leftover 2 pieces in a ziplock bag).  4. Place one piece of the cut adaptic over each nipple.  5. Fold a 2x2 gauze in half and place on top of the adaptic.  6. Carefully place the tegaderm protective cover over the top.  7. Change the dressing for the next 7 days.  8. Ace wrap for the next 7 days as well. (this is to help with any swelling).  You can add an extra 3 more days of dressing changes if you choose but you only need to cover the nipples for 7 days.     The glue strip over your incision will start to peel up and fall off after about 2 weeks but if after 3 weeks the glue strip is still in place you may need to help it come off in the shower.      Post-surgery Nipple FAQ S    1) Once I'm no longer using nipple dressings or the Ace wrap, do I just not put anything on my nipples?   No you don't have to.  Remember it is ok if each side is behaving differently in healing.   Should I be moisturizing them with  anything, or is it better not to?    You can moisturize if you would like but usually not until after week 3.     2) When can I let the spray from my shower head hit my chest directly?   Once you are done with the nipple dressings you may shower like you normally do.  And should I be washing my chest with soap, or just rinsing it?   You may wash with soap but remember soap can dry the skin and we don t recommend daily soap for your skin except for critical areas  After you are done with the nipple dressings, you may wash with soap but treat the nipples gently, so no washcloth or anything else rough for 8 weeks     3) When can I start wearing shirts that pull over my head instead of buttoning/zipping in the front?   As soon as it's not sore to do so, probably within 3-4 weeks.     4) I forget what you said about how much I can lift and when?   You will be on a 5 pound lifting restriction for 4 weeks.  Let your body be your guide, increase in 2 to 5lb increments. If frequent 50 pound lifting is typical, you can resume this again at 8 weeks from surgery     5) When can I start reaching my arms over my head?  After 2-3 weeks,  but gently.      6) When can I start swimming again?  After 6-8 weeks     7) I've been sleeping on my back, but I'm wondering when it's okay to sleep on my side.    After 3-4 weeks as tolerated.     8) When can I start to massage my scars?   Don t start until at least 3 weeks post op but then you can start gently massaging your scars whenever you like, though move in the direction of the incision.     9) What will reduce the scar?   Scars will lighten with time, approximately in one year. Sun exposure however will darken them so if you are concerned use sunscreen on the scars. Other scar reducing products are unproven but okay to try if you want.  Scarring depends on genetics, tension on the tissues during activities.    There are various options none proven and none covered by insurance.    Silicone strips - oils - vitamin E - Mederma - Frankincense, etc.      10) When are the nipples done sloughing?   Old nipple skin will peel off when new graft underneath has healed usually around 2-3 weeks post op.     11) What happens if there is drainage?   Keep clean and dry cover with gauze and bandaid. Ask about topical medications.    Always call the nurse at [phone] or [phone] if you are concerned.             Follow-ups after your visit        Who to contact     Please call your clinic at [phone] to:    Ask questions about your health    Make or cancel appointments    Discuss your medicines    Learn about your test results    Speak to your doctor            Additional Information About Your Visit        [employer]â„[employer] Information     [employer] is an electronic gateway that provides easy, online access to your medical records. With [employer], you can request a clinic appointment, read your test results, renew a prescription or communicate with your care team.     To sign up for [employer] visit the website at [email or web address]   You will be asked to enter the access code listed below, as well as some personal information. Please follow the directions to create your username and password.     Your access code is: [email or web address]  Expires: 2018  7:30 AM     Your access code will  in 90 days. If you need help or a new code, please contact your [facility] or call [phone] for assistance.        Care EveryWhere ID     This is your Care EveryWhere ID. This could be used by other organizations to access your [facility] medical records  [id number]        Your Vitals Were     Last Period                   2018            Blood Pressure from Last 3 Encounters:   18 93/63   18 123/78   17 122/82    Weight from Last 3 Encounters:   18 106 kg (233 lb 11 oz)   18 105.3 kg (232 lb 3.2 oz)   17 105.5 kg (232 lb 8 oz)               Today, you had the following     No orders found for display       Primary Care Provider Office Phone # Fax #    Clarice Read PA-C [phone]       [facility] [address] [place] [address]        Equal Access to Services     KERI GALO : Hadii aad ku hadadityao Soomaali, waaxda luqadaha, qaybta kaalmada adeegyada, waxay idiin hayaan adeeg angie earnest . So [facility] [phone].    ATENCIÓN: Si habla español, tiene a galindo disposición servicios gratuitos de asistencia lingüística. Llame al [phone].    We comply with applicable federal civil rights laws and [place] laws. We do not discriminate on the basis of race, color, national origin, age, disability, sex, sexual orientation, or gender identity.            Thank you!     Thank you for choosing [facility]  for your care. Our goal is always to provide you with excellent care. Hearing back from our patients is one way we can continue to improve our services. Please take a few minutes to complete the written survey that you may receive in the mail after your visit with us. Thank you!             Your Updated Medication List - Protect others around you: Learn how to safely use, store and throw away your medicines at www.disposemymeds.org.          This list is accurate as of 3/27/18  2:30 PM.  Always use your most recent med list.                   Brand Name Dispense Instructions for use Diagnosis    azithromycin 250 MG tablet    ZITHROMAX    6 tablet    Two tablets first day, then one tablet daily for four days.    S/P bilateral mastectomy       divalproex sodium delayed-release 250 MG DR tablet    DEPAKOTE    270 tablet    Take 3 tablets by mouth 2 times daily.    Intractable absence seizures (H)       fluticasone 50 MCG/ACT spray    FLONASE    1 Package    1 spray by Both Nostrils route 2 times daily. 1 spray each nostril AM & PM    Allergic rhinitis       hydrOXYzine 25 MG tablet    ATARAX    20 tablet    Take 1-2  tablets (25-50 mg) by mouth every 6 hours as needed for itching    S/P bilateral mastectomy       loratadine 10 MG tablet    CLARITIN     Take 10 mg by mouth daily        * ONDANSETRON PO      Take 4 mg by mouth every 6 hours as needed for nausea        * ondansetron 4 MG ODT tab    ZOFRAN ODT    20 tablet    Take 1-2 tablets (4-8 mg) by mouth every 8 hours as needed for nausea    S/P bilateral mastectomy       oxyCODONE IR 5 MG tablet    ROXICODONE    50 tablet    Take 1 tablet (5 mg) by mouth every 4 hours as needed for pain maximum 8 tablet(s) per day    S/P bilateral mastectomy       PROAIR  (90 BASE) MCG/ACT Inhaler   Generic drug:  albuterol     3 Inhaler    Inhale 2 puffs into the lungs every 2 hours as needed.    Cough       testosterone cypionate 200 MG/ML injection    DEPOTESTOTERONE     Inject 80 mg into the muscle        TYLENOL PO      Take 160 mg by mouth every 4 hours as needed for mild pain or fever        * Notice:  This list has 2 medication(s) that are the same as other medications prescribed for you. Read the directions carefully, and ask your doctor or other care provider to review them with you.

## 2018-03-29 NOTE — OP NOTE
Procedure Date: 03/19/2018      DATE OF SERVICE:  03/19/2018.      ATTENDING SURGEON:  Ely Ackerman MD.      RESIDENT SURGEON:  Dr. Irma Del Castillo.      PREOPERATIVE DIAGNOSIS:  Gender dysphoria, female-to-male transgender.      POSTOPERATIVE DIAGNOSIS:  Gender dysphoria, female-to-male transgender.      PROCEDURE:  Bilateral subcutaneous mastectomies with nipple grafts and On-Q catheter placement.      ANESTHESIA:  GET.      ESTIMATED BLOOD LOSS:  100 mL.      INTRAVENOUS FLUIDS:  1300 mL.      URINE OUTPUT:  1000 mL.      COUNTS:  Correct.      COMPLICATIONS:  None.      DRAINS:  VIC x 2.      SPECIMEN:  Right breast 909 g, left breast 838 g.      INDICATIONS:  This is a 27-year-old biological female transitioning to male with diagnosis of gender dysphoria.  Jen met WPATH criteria for top surgery.  Due to his breast volume and ptosis, he would require double incisions.  He was interested in having nipple grafts.      DESCRIPTION OF PROCEDURE:  The patient was seen in the preoperative waiting area.  The operative sites were marked.  This included the sternal notch, sternal midline, inframammary folds with extensions posterolaterally for possible dog ear excision.  We also zoran a vertical line through the nipple-areolar complex and transposed a transverse line from mid humerus.  Informed consent was obtained after reviewing the possible risks and complications including but not limited to the following:  Infection, bleeding, hematoma seroma formation, poor healing, including spitting sutures, dehisced incisions, or hypertrophic scarring; altered sensation of the chest wall, including hypersensitivity or hyposensitivity, residual deformities, asymmetries, need for further surgery, possible injury to surrounding neurovascular and musculoskeletal structures as well as intrathoracic and intra-axillary structures, and anesthetic risks such as DVT, PE and cardiopulmonary arrest.  The patient was then brought on a  stretcher to the OR and placed on the OR table supine.  After general anesthesia was administered, the patient was oral endotracheally intubated, a Arias was placed.  The patient already had sequential compression devices on his lower extremities prior to induction.  Additional pillows were used under the thighs and forelegs with pink foam under safety straps across the thigh and forelegs.  The arms were secured to arm boards with Ace wraps.  The patient was checked for symmetry, and adjustments were made as needed in the sitting position.  The chest breast area was then prepped and draped in the usual sterile fashion using ChloraPrep.  A timeout was taken and the proper patient and procedure were identified.  We then began our inframammary fold incisions.  Peak plasma blade was used on the right side and a traditional Valleylab cautery with scalpel was used on the left.  After making the IMF incision, approximately 2 cm of subcutaneous fat was left along the incision for beveling down to the pectoralis fascia.  The breast mound was undermined up towards the clavicle, medially towards parasternal border, and laterally around the corner from the pectoralis border.  This allowed us to pull the breast mound inferiorly and galdino where it overlapped with the IMF incision.  The nipple areolar complex was harvested sharply and kept on the backtable wrapped in normal saline gauze marked per side.  The breast mound itself was then amputated at the level of overlap.  Great care was taken to not undermine excessively the skin flap.  Resected breast tissue was measured off the backtable and ultimately sent to pathology for histologic examination.  Our incisions were then temporarily skin stapled and the patient was put into a sitting position.  This allowed us to make some further adjustments to gain symmetry with regard to both level and shape of the IMF incisions.  We also made extensions to resect dog ears laterally.  The  trimmings were completed and the additional tissue was added to the specimens.  The dissection pocket was irrigated with triple antibiotic solution.  Hemostasis was achieved with cautery.  A #15 round VIC drain was introduced through separate stab wound incisions laterally and secured with 3-0 nylon suture.  This was draped along the inferior aspect of the pocket.  Dual 10-inch On-Q catheters were placed percutaneously from the epigastric region and draped along the superior aspect of the pocket.  These were secured with benzoin and Tegaderm.  When we were happy with both our flap thickness and contour as well as the incisional shapes and levels and minimal dog-ears, definitive closure was then achieved with 3-0 Vicryl deep dermal buried sutures followed by 4-0 Vicryl running subcuticular suture.  Ultimately, the incision was covered with Dermabond Prineo.  The drains were trimmed and put to bulb suction.  We then made nipple templates out of a , approximately 1.5 cm in diameter.  The patient was put into a sitting position and once again this helped us determine the most symmetric and anesthetic location for the nipple grafts.  These areas were marked and then de-epithelialized sharply with a #15 blade.  The nipple grafts themselves were thinned aggressively and trimmed of excess nipple and areola.  They were secured with 5-0 fast absorbing gut interrupted and running cuticular sutures.  A bolster dressing was applied using Xeroform sheet and antibiotic-soaked cotton balls.  These were held in place with skin staples and to also tie overs.  Kerlix roll was draped along the anterior surface of the chest with ABDs for drain sponges.  A double long 6-inch Ace wrap was used to circumferentially compress the chest.  A Arias was removed.  The patient was extubated and transferred to a stretcher and taken to the recovery room in satisfactory condition having tolerated the procedure without difficulty or  complication.  Tissues removed that were sent to pathology were 909 grams of breast tissue from the right and 838 grams from the left.  Of note, the On-Q catheters were attached to a reservoir containing 550 mL of 0.25% bupivacaine to be delivered at 2 mL per hour per catheter for the next 5 days.  This is due to a natural shortage of ropivacaine.         LEO DAVIES MD             D: 2018   T: 2018   MT: ALEJO      Name:     LAI SCHNEIDER   MRN:      [id number]        Account:        [id number]   :      1990           Procedure Date: 2018      Document: [id number]

## 2018-05-08 ENCOUNTER — OFFICE VISIT (OUTPATIENT)
Dept: PLASTIC SURGERY | Facility: CLINIC | Age: 28
End: 2018-05-08
Payer: COMMERCIAL

## 2018-05-08 DIAGNOSIS — Z90.13 S/P BILATERAL MASTECTOMY: Primary | ICD-10-CM

## 2018-05-08 NOTE — MR AVS SNAPSHOT
After Visit Summary   2018    Luisana Quinonez    MRN: [id number]           Patient Information     Date Of Birth          1990        Visit Information        Provider Department      2018 4:30 PM Ely Ackerman MD [facility]astic [facility]ive Surgery        Today's Diagnoses     S/P bilateral mastectomy    -  1       Follow-ups after your visit        Who to contact     Please call your clinic at [phone] to:    Ask questions about your health    Make or cancel appointments    Discuss your medicines    Learn about your test results    Speak to your doctor            Additional Information About Your Visit        MyChart Information     "[employer] is an electronic gateway that provides easy, online access to your medical records. With "[employer] you can request a clinic appointment, read your test results, renew a prescription or communicate with your care team.     To sign up for "[employer] visit the website at [email or web address]   You will be asked to enter the access code listed below, as well as some personal information. Please follow the directions to create your username and password.     Your access code is: [email or web address]  Expires: 2018  6:30 AM     Your access code will  in 90 days. If you need help or a new code, please contact your [facility] or call [phone] for assistance.        Care EveryWhere ID     This is your Care EveryWhere ID. This could be used by other organizations to access your [facility] medical records  [id number]         Blood Pressure from Last 3 Encounters:   18 93/63   18 123/78   17 122/82    Weight from Last 3 Encounters:   18 233 lb 11 oz   18 232 lb 3.2 oz   17 232 lb 8 oz              Today, you had the following     No orders found for display       Primary Care Provider Office Phone # Fax #    Clarice Read PA-C [phone]         [facility] [address] [address]        Equal Access to Services     KERI GALO : Hadii aad ku hadadityakatlyn Doryhumberto, wadayanada ezequielriosha, qaoctaviota kaasterriley saxena, dolores ramsoniyaradha mai. So [facility] [phone].    ATENCIÓN: Si habla español, tiene a galindo disposición servicios gratuitos de asistencia lingüística. [facility] [phone].    We comply with applicable federal civil rights laws and [place] laws. We do not discriminate on the basis of race, color, national origin, age, disability, sex, sexual orientation, or gender identity.            Thank you!     Thank you for choosing [facility]  for your care. Our goal is always to provide you with excellent care. Hearing back from our patients is one way we can continue to improve our services. Please take a few minutes to complete the written survey that you may receive in the mail after your visit with us. Thank you!             Your Updated Medication List - Protect others around you: Learn how to safely use, store and throw away your medicines at www.disposemymeds.org.          This list is accurate as of 5/8/18 11:59 PM.  Always use your most recent med list.                   Brand Name Dispense Instructions for use Diagnosis    divalproex sodium delayed-release 250 MG DR tablet    DEPAKOTE    270 tablet    Take 3 tablets by mouth 2 times daily.    Intractable absence seizures (H)       fluticasone 50 MCG/ACT spray    FLONASE    1 Package    1 spray by Both Nostrils route 2 times daily. 1 spray each nostril AM & PM    Allergic rhinitis       loratadine 10 MG tablet    CLARITIN     Take 10 mg by mouth daily        ONDANSETRON PO      Take 4 mg by mouth every 6 hours as needed for nausea        PROAIR  (90 Base) MCG/ACT Inhaler   Generic drug:  albuterol     3 Inhaler    Inhale 2 puffs into the lungs every 2 hours as needed.    Cough       testosterone cypionate 200 MG/ML injection     DEPOTESTOTERONE     Inject 80 mg into the muscle        TYLENOL PO      Take 160 mg by mouth every 4 hours as needed for mild pain or fever

## 2018-05-14 NOTE — PROGRESS NOTES
"PLASTICS FOLLOW UP    This 27 year old trans male is here today with his partner for follow up after bilateral SQ mastectomies with nipple grafts on 3/19/18.  He has been having significant suture spitting on the right side incision only.  He is also complaining of \"zingers\" from the left nipple graft because it is slightly more lateral in position and his arm rubs it more.   All areas feel \"tight\" and there is pulling sensation laterally.     He has been using \"Miracle Oil\" to massage his scars maybe every other day. He uses Motrin and ice packs when he overdoes his activities at work as a . He feels that there is still some limitations to his FROM at work.    On exam, he has fairly good symmetry and contour. The left nipple graft is a bit more lateral than the right. There are multiple spitting sutures along the right side incision that he can pluck out as they emerge. There is a very tiny dogear on the right posterior end of the incision. He has some acne involving the central chest.  Photos were taken with verbal permission today.     As for activities, he is extremely anxious to participate in softball this season and has already tried a little activity despite our orders to wait until his follow up. Since he has already started, he may resume activities as tolerated with the understanding that he stop if he experiences pain. He understands that this activity may lead to thicker hypertrophic scarring that may not respond to conservative topical management. His partner will try to keep him from doing too much, but he seems pretty stubborn.    They are getting  in August and will need a letter for his name change at that time.     I have asked him to return to clinic at the 6 month postop galdino so we can make sure his symptoms are resolving.    "

## 2018-11-13 ENCOUNTER — OFFICE VISIT (OUTPATIENT)
Dept: PLASTIC SURGERY | Facility: CLINIC | Age: 28
End: 2018-11-13
Payer: COMMERCIAL

## 2018-11-13 DIAGNOSIS — Z90.13 S/P BILATERAL MASTECTOMY: Primary | ICD-10-CM

## 2018-11-13 ASSESSMENT — PAIN SCALES - GENERAL: PAINLEVEL: SEVERE PAIN (6)

## 2018-11-13 NOTE — PROGRESS NOTES
PLASTICS POST-OP  This is a 27 year old trans male with a history of gender dysphoria who presents 8 months post-op after a subcutaneous double mastectomy with nipple graft conservation on 3/19/2018. He is having rare zingers. He has full range of motion and has returned to normal activities. He reports his incisions itches occasionally, but are not really tender.  The patient works at [employer] which is very stressful.     Vitals: There were no vitals taken for this visit.  PE: General: Height: Data Unavailable Weight: 0 lbs 0 oz   Chest: Some prominence right nipple. Small dogear right side. Good symmetry and contour. Both flaps are thicker with fullness over pecs. Mild hypertrophic scarring bilaterally. Nipple is smaller on right with more contraction. Left nipple more lateral. Photos taken with verbal consent.    A&P: 27 year old trans male who presents 8 months post-op after a subcutaneous double mastectomy with nipple graft conservation. We discussed his return to normal activities. He will follow up as needed; reasons for returning sooner were discussed.       He will follow up PRN. Causes for returning sooner were discussed.     Total time = 10 minutes, greater than half spent discussing scar healing options.    This note was prepared on behalf of Ely Ackerman MD, by Iliana Gil, a trained medical scribe, based on my observations and the provider's statements to me.

## 2018-11-13 NOTE — LETTER
11/13/2018       RE: Luisana Quinonez  [address]  [place] [address]     Dear Colleague,    Thank you for referring your patient, Luisana Quinonez, to the [facility]LAST[facility]ON[facility] SURGERY at [facility]. Please see a copy of my visit note below.    PLASTICS POST-OP  This is a 27 year old trans male with a history of gender dysphoria who presents 8 months post-op after a subcutaneous double mastectomy with nipple graft conservation on 3/19/2018. He is having rare zingers. He has full range of motion and has returned to normal activities. He reports his incisions itches occasionally, but are not really tender.  The patient works at [employer] which is very stressful.     Vitals: There were no vitals taken for this visit.  PE: General: Height: Data Unavailable Weight: 0 lbs 0 oz   Chest: Some prominence right nipple. Small dogear right side. Good symmetry and contour. Both flaps are thicker with fullness over pecs. Mild hypertrophic scarring bilaterally. Nipple is smaller on right with more contraction. Left nipple more lateral. Photos taken with verbal consent.    A&P: 27 year old trans male who presents 8 months post-op after a subcutaneous double mastectomy with nipple graft conservation. We discussed his return to normal activities. He will follow up as needed; reasons for returning sooner were discussed.       He will follow up PRN. Causes for returning sooner were discussed.     Total time = 10 minutes, greater than half spent discussing scar healing options.    This note was prepared on behalf of Ely Ackerman MD, by Iliana Gil, a trained medical scribe, based on my observations and the provider's statements to me.            Again, thank you for allowing me to participate in the care of your patient.      Sincerely,    Ely Ackerman MD

## (undated) DEVICE — SUCTION MANIFOLD DORNOCH ULTRA CART UL-CL500

## (undated) DEVICE — LINEN TOWEL PACK X5 5464

## (undated) DEVICE — ESU BLADE PEAK PLASMA 3.0S PS210-030S

## (undated) DEVICE — SPONGE LAP 18X18" X8435

## (undated) DEVICE — SU DERMABOND PRINEO CLR602US

## (undated) DEVICE — SOL ADH LIQUID BENZOIN SWAB 0.6ML C1544

## (undated) DEVICE — TUBING SUCTION MEDI-VAC 1/4"X20' N620A

## (undated) DEVICE — BNDG ELASTIC 6" DBL LENGTH UNSTERILE 6611-16

## (undated) DEVICE — LIGHT HANDLE X2

## (undated) DEVICE — EYE MARKING PAD 581057

## (undated) DEVICE — BLADE KNIFE SURG 10 371110

## (undated) DEVICE — PEN MARKING SKIN W/LABELS 31145918

## (undated) DEVICE — CATH TRAY FOLEY SURESTEP 16FR WDRAIN BAG STLK LATEX A300316A

## (undated) DEVICE — DRAPE LAP TRANSVERSE 29421

## (undated) DEVICE — DRAIN JACKSON PRATT 15FR ROUND SU130-1323

## (undated) DEVICE — DECANTER TRANSFER DEVICE 2008S

## (undated) DEVICE — Device

## (undated) DEVICE — SU SILK 2-0 SH 30" K833H

## (undated) DEVICE — GOWN XLG DISP 9545

## (undated) DEVICE — DRAIN JACKSON PRATT RESERVOIR 100ML SU130-1305

## (undated) DEVICE — STPL SKIN 35W APPOSE 8886803712

## (undated) DEVICE — HEADREST FOAM 9" PINK

## (undated) DEVICE — ESU GROUND PAD UNIVERSAL W/O CORD

## (undated) DEVICE — LINEN ORTHO PACK 5446

## (undated) DEVICE — WIPES FOLEY CARE SURESTEP PROVON DFC100

## (undated) DEVICE — DRSG TEGADERM 2 3/8X2 3/4" 1624W

## (undated) DEVICE — STRAP KNEE/BODY 31143004

## (undated) DEVICE — DRSG XEROFORM 5X9" 8884431605

## (undated) DEVICE — PAD CHUX UNDERPAD 30X30"

## (undated) DEVICE — BNDG ELASTIC 6"X5YDS UNSTERILE 6611-60

## (undated) DEVICE — DRSG ABDOMINAL 07 1/2X8" 7197D

## (undated) DEVICE — SU PLAIN FAST ABSORB 5-0 PC-1 18" 1915G

## (undated) DEVICE — SU SILK 2-0 TIE 12X30" A305H

## (undated) DEVICE — JELLY LUBRICATING SURGILUBE 2OZ TUBE

## (undated) DEVICE — SOL WATER IRRIG 1000ML BOTTLE 2F7114

## (undated) DEVICE — NDL 18GA 1.5" 305196

## (undated) DEVICE — SPECIMEN CONTAINER 5OZ STERILE 2600SA

## (undated) DEVICE — SU VICRYL 4-0 PS-1 18" UND J682G

## (undated) DEVICE — PREP CHLORAPREP 26ML TINTED ORANGE  260815

## (undated) DEVICE — BLADE KNIFE SURG 15 371115

## (undated) DEVICE — DRSG COTTON BALL 6PK LCB62

## (undated) DEVICE — ESU PENCIL W/SMOKE EVAC NEPTUNE STRYKER 0703-046-000

## (undated) DEVICE — SYR BULB IRRIG 50ML LATEX FREE 0035280

## (undated) DEVICE — SYR 10ML LL W/O NDL 302995

## (undated) DEVICE — GLOVE PROTEXIS MICRO 6.5  2D73PM65

## (undated) DEVICE — SU ETHILON 3-0 FS-1 18" 663G

## (undated) DEVICE — DRSG KERLIX 4 1/2"X4YDS ROLL 6730

## (undated) DEVICE — SU VICRYL 3-0 FS-1 27" J442H

## (undated) DEVICE — PAD ARMBOARD FOAM EGGCRATE COVIDEN 3114367

## (undated) RX ORDER — ONDANSETRON 2 MG/ML
INJECTION INTRAMUSCULAR; INTRAVENOUS
Status: DISPENSED
Start: 2018-03-19

## (undated) RX ORDER — CEFAZOLIN SODIUM 2 G/100ML
INJECTION, SOLUTION INTRAVENOUS
Status: DISPENSED
Start: 2018-03-19

## (undated) RX ORDER — OXYCODONE HYDROCHLORIDE 5 MG/1
TABLET ORAL
Status: DISPENSED
Start: 2018-03-19

## (undated) RX ORDER — PHENYLEPHRINE HCL IN 0.9% NACL 1 MG/10 ML
SYRINGE (ML) INTRAVENOUS
Status: DISPENSED
Start: 2018-03-19

## (undated) RX ORDER — SCOLOPAMINE TRANSDERMAL SYSTEM 1 MG/1
PATCH, EXTENDED RELEASE TRANSDERMAL
Status: DISPENSED
Start: 2018-03-19

## (undated) RX ORDER — ALBUTEROL SULFATE 0.83 MG/ML
SOLUTION RESPIRATORY (INHALATION)
Status: DISPENSED
Start: 2018-03-19

## (undated) RX ORDER — PROPOFOL 10 MG/ML
INJECTION, EMULSION INTRAVENOUS
Status: DISPENSED
Start: 2018-03-19

## (undated) RX ORDER — KETOROLAC TROMETHAMINE 30 MG/ML
INJECTION, SOLUTION INTRAMUSCULAR; INTRAVENOUS
Status: DISPENSED
Start: 2018-03-19

## (undated) RX ORDER — ACETAMINOPHEN 325 MG/1
TABLET ORAL
Status: DISPENSED
Start: 2018-03-19

## (undated) RX ORDER — CEFAZOLIN SODIUM 1 G/3ML
INJECTION, POWDER, FOR SOLUTION INTRAMUSCULAR; INTRAVENOUS
Status: DISPENSED
Start: 2018-03-19

## (undated) RX ORDER — DEXAMETHASONE SODIUM PHOSPHATE 4 MG/ML
INJECTION, SOLUTION INTRA-ARTICULAR; INTRALESIONAL; INTRAMUSCULAR; INTRAVENOUS; SOFT TISSUE
Status: DISPENSED
Start: 2018-03-19

## (undated) RX ORDER — BUPIVACAINE HYDROCHLORIDE 5 MG/ML
INJECTION, SOLUTION PERINEURAL
Status: DISPENSED
Start: 2018-03-19

## (undated) RX ORDER — FENTANYL CITRATE 50 UG/ML
INJECTION, SOLUTION INTRAMUSCULAR; INTRAVENOUS
Status: DISPENSED
Start: 2018-03-19

## (undated) RX ORDER — GABAPENTIN 300 MG/1
CAPSULE ORAL
Status: DISPENSED
Start: 2018-03-19

## (undated) RX ORDER — LIDOCAINE HYDROCHLORIDE 20 MG/ML
INJECTION, SOLUTION EPIDURAL; INFILTRATION; INTRACAUDAL; PERINEURAL
Status: DISPENSED
Start: 2018-03-19

## (undated) RX ORDER — HYDROXYZINE HYDROCHLORIDE 25 MG/1
TABLET, FILM COATED ORAL
Status: DISPENSED
Start: 2018-03-19

## (undated) RX ORDER — HYDROMORPHONE HYDROCHLORIDE 1 MG/ML
INJECTION, SOLUTION INTRAMUSCULAR; INTRAVENOUS; SUBCUTANEOUS
Status: DISPENSED
Start: 2018-03-19